# Patient Record
Sex: FEMALE | Race: WHITE | NOT HISPANIC OR LATINO
[De-identification: names, ages, dates, MRNs, and addresses within clinical notes are randomized per-mention and may not be internally consistent; named-entity substitution may affect disease eponyms.]

---

## 2017-01-03 ENCOUNTER — RX RENEWAL (OUTPATIENT)
Age: 72
End: 2017-01-03

## 2017-01-09 ENCOUNTER — APPOINTMENT (OUTPATIENT)
Dept: INFUSION THERAPY | Facility: CLINIC | Age: 72
End: 2017-01-09

## 2017-01-13 ENCOUNTER — APPOINTMENT (OUTPATIENT)
Age: 72
End: 2017-01-13

## 2017-01-13 VITALS — RESPIRATION RATE: 16 BRPM | DIASTOLIC BLOOD PRESSURE: 80 MMHG | HEART RATE: 78 BPM | SYSTOLIC BLOOD PRESSURE: 134 MMHG

## 2017-01-13 DIAGNOSIS — E11.9 TYPE 2 DIABETES MELLITUS W/OUT COMPLICATIONS: ICD-10-CM

## 2017-02-06 ENCOUNTER — APPOINTMENT (OUTPATIENT)
Dept: INFUSION THERAPY | Facility: CLINIC | Age: 72
End: 2017-02-06

## 2017-02-10 ENCOUNTER — RX RENEWAL (OUTPATIENT)
Age: 72
End: 2017-02-10

## 2017-03-06 ENCOUNTER — APPOINTMENT (OUTPATIENT)
Dept: INFUSION THERAPY | Facility: CLINIC | Age: 72
End: 2017-03-06

## 2017-03-13 ENCOUNTER — RX RENEWAL (OUTPATIENT)
Age: 72
End: 2017-03-13

## 2017-03-13 DIAGNOSIS — G62.9 POLYNEUROPATHY, UNSPECIFIED: ICD-10-CM

## 2017-03-22 ENCOUNTER — RECORD ABSTRACTING (OUTPATIENT)
Age: 72
End: 2017-03-22

## 2017-03-22 DIAGNOSIS — Z92.89 PERSONAL HISTORY OF OTHER MEDICAL TREATMENT: ICD-10-CM

## 2017-04-03 ENCOUNTER — APPOINTMENT (OUTPATIENT)
Dept: BREAST CENTER | Facility: CLINIC | Age: 72
End: 2017-04-03

## 2017-04-03 ENCOUNTER — APPOINTMENT (OUTPATIENT)
Dept: INFUSION THERAPY | Facility: CLINIC | Age: 72
End: 2017-04-03

## 2017-04-03 VITALS
SYSTOLIC BLOOD PRESSURE: 138 MMHG | HEIGHT: 67 IN | WEIGHT: 204 LBS | DIASTOLIC BLOOD PRESSURE: 88 MMHG | BODY MASS INDEX: 32.02 KG/M2

## 2017-04-10 ENCOUNTER — RX RENEWAL (OUTPATIENT)
Age: 72
End: 2017-04-10

## 2017-04-21 ENCOUNTER — APPOINTMENT (OUTPATIENT)
Age: 72
End: 2017-04-21

## 2017-05-01 ENCOUNTER — APPOINTMENT (OUTPATIENT)
Dept: INFUSION THERAPY | Facility: CLINIC | Age: 72
End: 2017-05-01

## 2017-05-01 VITALS — HEART RATE: 69 BPM | DIASTOLIC BLOOD PRESSURE: 94 MMHG | RESPIRATION RATE: 14 BRPM | SYSTOLIC BLOOD PRESSURE: 164 MMHG

## 2017-05-11 ENCOUNTER — RX RENEWAL (OUTPATIENT)
Age: 72
End: 2017-05-11

## 2017-05-11 RX ORDER — GABAPENTIN 600 MG/1
600 TABLET, COATED ORAL 3 TIMES DAILY
Qty: 90 | Refills: 0 | Status: ACTIVE | COMMUNITY
Start: 2017-05-11 | End: 1900-01-01

## 2017-05-11 RX ORDER — METHADONE HYDROCHLORIDE 5 MG/1
5 TABLET ORAL 3 TIMES DAILY
Qty: 90 | Refills: 0 | Status: ACTIVE | COMMUNITY
Start: 2017-05-11 | End: 1900-01-01

## 2017-05-31 ENCOUNTER — APPOINTMENT (OUTPATIENT)
Dept: HEMATOLOGY ONCOLOGY | Facility: CLINIC | Age: 72
End: 2017-05-31

## 2017-06-05 ENCOUNTER — APPOINTMENT (OUTPATIENT)
Dept: INFUSION THERAPY | Facility: CLINIC | Age: 72
End: 2017-06-05

## 2017-06-05 ENCOUNTER — APPOINTMENT (OUTPATIENT)
Dept: HEMATOLOGY ONCOLOGY | Facility: CLINIC | Age: 72
End: 2017-06-05

## 2017-06-05 VITALS
WEIGHT: 204 LBS | DIASTOLIC BLOOD PRESSURE: 82 MMHG | RESPIRATION RATE: 14 BRPM | TEMPERATURE: 96.5 F | SYSTOLIC BLOOD PRESSURE: 182 MMHG | HEART RATE: 69 BPM | BODY MASS INDEX: 32.02 KG/M2 | HEIGHT: 67 IN

## 2017-06-05 RX ORDER — DORZOLAMIDE HYDROCHLORIDE TIMOLOL MALEATE 20; 5 MG/ML; MG/ML
22.3-6.8 SOLUTION/ DROPS OPHTHALMIC
Qty: 10 | Refills: 0 | Status: ACTIVE | COMMUNITY
Start: 2017-02-23

## 2017-06-05 RX ORDER — TOBRAMYCIN AND DEXAMETHASONE 3; 1 MG/ML; MG/ML
0.3-0.1 SUSPENSION/ DROPS OPHTHALMIC
Qty: 5 | Refills: 0 | Status: ACTIVE | COMMUNITY
Start: 2017-05-22

## 2017-06-05 RX ORDER — LOTEPREDNOL ETABONATE 5 MG/G
0.5 GEL OPHTHALMIC
Qty: 5 | Refills: 0 | Status: COMPLETED | COMMUNITY
Start: 2017-02-14

## 2017-06-05 RX ORDER — CYCLOSPORINE 0.5 MG/ML
0.05 EMULSION OPHTHALMIC
Qty: 60 | Refills: 0 | Status: COMPLETED | COMMUNITY
Start: 2017-01-10

## 2017-06-09 ENCOUNTER — RX RENEWAL (OUTPATIENT)
Age: 72
End: 2017-06-09

## 2017-06-09 RX ORDER — METHADONE HYDROCHLORIDE 5 MG/1
5 TABLET ORAL EVERY 8 HOURS
Qty: 90 | Refills: 0 | Status: ACTIVE | COMMUNITY
Start: 2017-06-09 | End: 1900-01-01

## 2017-07-10 ENCOUNTER — APPOINTMENT (OUTPATIENT)
Dept: INFUSION THERAPY | Facility: CLINIC | Age: 72
End: 2017-07-10

## 2017-07-10 ENCOUNTER — OUTPATIENT (OUTPATIENT)
Dept: OUTPATIENT SERVICES | Facility: HOSPITAL | Age: 72
LOS: 1 days | Discharge: HOME | End: 2017-07-10

## 2017-07-10 DIAGNOSIS — C50.911 MALIGNANT NEOPLASM OF UNSPECIFIED SITE OF RIGHT FEMALE BREAST: ICD-10-CM

## 2017-07-10 DIAGNOSIS — C50.811 MALIGNANT NEOPLASM OF OVERLAPPING SITES OF RIGHT FEMALE BREAST: ICD-10-CM

## 2017-07-11 ENCOUNTER — RX RENEWAL (OUTPATIENT)
Age: 72
End: 2017-07-11

## 2017-07-11 RX ORDER — METHADONE HYDROCHLORIDE 5 MG/1
5 TABLET ORAL EVERY 8 HOURS
Qty: 90 | Refills: 0 | Status: ACTIVE | COMMUNITY
Start: 2017-07-11 | End: 1900-01-01

## 2017-08-07 ENCOUNTER — APPOINTMENT (OUTPATIENT)
Dept: INFUSION THERAPY | Facility: CLINIC | Age: 72
End: 2017-08-07

## 2017-08-09 ENCOUNTER — RX RENEWAL (OUTPATIENT)
Age: 72
End: 2017-08-09

## 2017-09-08 ENCOUNTER — APPOINTMENT (OUTPATIENT)
Dept: INFUSION THERAPY | Facility: CLINIC | Age: 72
End: 2017-09-08

## 2017-09-08 ENCOUNTER — RX RENEWAL (OUTPATIENT)
Age: 72
End: 2017-09-08

## 2017-09-14 ENCOUNTER — OUTPATIENT (OUTPATIENT)
Dept: OUTPATIENT SERVICES | Facility: HOSPITAL | Age: 72
LOS: 1 days | Discharge: HOME | End: 2017-09-14

## 2017-09-14 ENCOUNTER — APPOINTMENT (OUTPATIENT)
Age: 72
End: 2017-09-14

## 2017-09-14 VITALS
HEART RATE: 62 BPM | RESPIRATION RATE: 17 BRPM | SYSTOLIC BLOOD PRESSURE: 146 MMHG | DIASTOLIC BLOOD PRESSURE: 71 MMHG | WEIGHT: 200 LBS | TEMPERATURE: 97 F | HEIGHT: 67 IN | BODY MASS INDEX: 31.39 KG/M2

## 2017-09-14 DIAGNOSIS — Z02.9 ENCOUNTER FOR ADMINISTRATIVE EXAMINATIONS, UNSPECIFIED: ICD-10-CM

## 2017-10-02 ENCOUNTER — APPOINTMENT (OUTPATIENT)
Dept: INFUSION THERAPY | Facility: CLINIC | Age: 72
End: 2017-10-02

## 2017-10-10 ENCOUNTER — RX RENEWAL (OUTPATIENT)
Age: 72
End: 2017-10-10

## 2017-10-23 ENCOUNTER — OUTPATIENT (OUTPATIENT)
Dept: OUTPATIENT SERVICES | Facility: HOSPITAL | Age: 72
LOS: 1 days | Discharge: HOME | End: 2017-10-23

## 2017-10-23 DIAGNOSIS — Z85.3 PERSONAL HISTORY OF MALIGNANT NEOPLASM OF BREAST: ICD-10-CM

## 2017-11-03 ENCOUNTER — APPOINTMENT (OUTPATIENT)
Dept: BREAST CENTER | Facility: CLINIC | Age: 72
End: 2017-11-03
Payer: MEDICARE

## 2017-11-03 VITALS
DIASTOLIC BLOOD PRESSURE: 78 MMHG | HEIGHT: 67 IN | OXYGEN SATURATION: 97 % | WEIGHT: 191 LBS | SYSTOLIC BLOOD PRESSURE: 132 MMHG | HEART RATE: 56 BPM | BODY MASS INDEX: 29.98 KG/M2

## 2017-11-03 PROCEDURE — 99213 OFFICE O/P EST LOW 20 MIN: CPT

## 2017-11-06 ENCOUNTER — RX RENEWAL (OUTPATIENT)
Age: 72
End: 2017-11-06

## 2017-12-04 ENCOUNTER — APPOINTMENT (OUTPATIENT)
Dept: INFUSION THERAPY | Facility: CLINIC | Age: 72
End: 2017-12-04

## 2017-12-04 ENCOUNTER — APPOINTMENT (OUTPATIENT)
Dept: HEMATOLOGY ONCOLOGY | Facility: CLINIC | Age: 72
End: 2017-12-04

## 2017-12-04 VITALS
SYSTOLIC BLOOD PRESSURE: 173 MMHG | BODY MASS INDEX: 30.29 KG/M2 | DIASTOLIC BLOOD PRESSURE: 73 MMHG | HEART RATE: 61 BPM | RESPIRATION RATE: 14 BRPM | HEIGHT: 67 IN | WEIGHT: 193 LBS

## 2017-12-07 ENCOUNTER — RX RENEWAL (OUTPATIENT)
Age: 72
End: 2017-12-07

## 2018-01-01 LAB
ALBUMIN SERPL-MCNC: 3.6 G/DL
ALBUMIN/GLOB SERPL: 1.57
ALP SERPL-CCNC: 76 IU/L
ALT SERPL-CCNC: 16 IU/L
ANION GAP SERPL CALC-SCNC: 6 MEQ/L
AST SERPL-CCNC: 19 IU/L
BASOPHILS # BLD: 0.02 TH/MM3
BASOPHILS NFR BLD: 0.4 %
BILIRUB SERPL-MCNC: 0.6 MG/DL
BUN SERPL-MCNC: 12 MG/DL
BUN/CREAT SERPL: 22.2 %
CALCIUM SERPL-MCNC: 9.3 MG/DL
CANCER AG15-3 SERPL-ACNC: 9.8 U/ML
CEA SERPL-MCNC: 2.7 NG/ML
CHLORIDE SERPL-SCNC: 108 MEQ/L
CO2 SERPL-SCNC: 26 MEQ/L
CREAT SERPL-MCNC: 0.54 MG/DL
EOSINOPHIL # BLD: 0.2 TH/MM3
EOSINOPHIL NFR BLD: 3.5 %
ERYTHROCYTE [DISTWIDTH] IN BLOOD BY AUTOMATED COUNT: 13.4 %
GFR SERPL CREATININE-BSD FRML MDRD: 111
GLUCOSE SERPL-MCNC: 166 MG/DL
GRANULOCYTES # BLD: 3.83 TH/MM3
GRANULOCYTES NFR BLD: 67.6 %
HCT VFR BLD AUTO: 38.2 %
HGB BLD-MCNC: 12.6 G/DL
IMM GRANULOCYTES # BLD: 0.01 TH/MM3
IMM GRANULOCYTES NFR BLD: 0.2 %
LYMPHOCYTES # BLD: 1.05 TH/MM3
LYMPHOCYTES NFR BLD: 18.6 %
MCH RBC QN AUTO: 29.5 PG
MCHC RBC AUTO-ENTMCNC: 33 G/DL
MCV RBC AUTO: 89.5 FL
MONOCYTES # BLD: 0.55 TH/MM3
MONOCYTES NFR BLD: 9.7 %
PLATELET # BLD: 172 TH/MM3
PMV BLD AUTO: 10.8 FL
POTASSIUM SERPL-SCNC: 4 MMOL/L
PROT SERPL-MCNC: 5.9 G/DL
RBC # BLD AUTO: 4.27 MIL/MM3
SODIUM SERPL-SCNC: 140 MEQ/L
WBC # BLD: 5.66 TH/MM3

## 2018-01-05 ENCOUNTER — RX RENEWAL (OUTPATIENT)
Age: 73
End: 2018-01-05

## 2018-01-11 ENCOUNTER — OUTPATIENT (OUTPATIENT)
Dept: OUTPATIENT SERVICES | Facility: HOSPITAL | Age: 73
LOS: 1 days | Discharge: HOME | End: 2018-01-11

## 2018-01-11 ENCOUNTER — APPOINTMENT (OUTPATIENT)
Dept: INFUSION THERAPY | Facility: CLINIC | Age: 73
End: 2018-01-11

## 2018-01-11 DIAGNOSIS — C50.911 MALIGNANT NEOPLASM OF UNSPECIFIED SITE OF RIGHT FEMALE BREAST: ICD-10-CM

## 2018-02-01 ENCOUNTER — RX RENEWAL (OUTPATIENT)
Age: 73
End: 2018-02-01

## 2018-02-05 ENCOUNTER — RX RENEWAL (OUTPATIENT)
Age: 73
End: 2018-02-05

## 2018-02-16 ENCOUNTER — OUTPATIENT (OUTPATIENT)
Dept: OUTPATIENT SERVICES | Facility: HOSPITAL | Age: 73
LOS: 1 days | Discharge: HOME | End: 2018-02-16

## 2018-02-16 ENCOUNTER — APPOINTMENT (OUTPATIENT)
Dept: INFUSION THERAPY | Facility: CLINIC | Age: 73
End: 2018-02-16

## 2018-02-16 ENCOUNTER — APPOINTMENT (OUTPATIENT)
Age: 73
End: 2018-02-16

## 2018-02-26 DIAGNOSIS — G89.3 NEOPLASM RELATED PAIN (ACUTE) (CHRONIC): ICD-10-CM

## 2018-02-26 DIAGNOSIS — Z51.5 ENCOUNTER FOR PALLIATIVE CARE: ICD-10-CM

## 2018-03-07 ENCOUNTER — RX RENEWAL (OUTPATIENT)
Age: 73
End: 2018-03-07

## 2018-03-08 ENCOUNTER — APPOINTMENT (OUTPATIENT)
Dept: INFUSION THERAPY | Facility: CLINIC | Age: 73
End: 2018-03-08

## 2018-04-05 ENCOUNTER — RX RENEWAL (OUTPATIENT)
Age: 73
End: 2018-04-05

## 2018-04-05 ENCOUNTER — APPOINTMENT (OUTPATIENT)
Dept: INFUSION THERAPY | Facility: CLINIC | Age: 73
End: 2018-04-05

## 2018-04-05 RX ORDER — GABAPENTIN 600 MG/1
600 TABLET, COATED ORAL 3 TIMES DAILY
Qty: 90 | Refills: 4 | Status: ACTIVE | COMMUNITY
Start: 2017-06-09 | End: 1900-01-01

## 2018-05-03 ENCOUNTER — OUTPATIENT (OUTPATIENT)
Dept: OUTPATIENT SERVICES | Facility: HOSPITAL | Age: 73
LOS: 1 days | Discharge: HOME | End: 2018-05-03

## 2018-05-03 ENCOUNTER — APPOINTMENT (OUTPATIENT)
Dept: INFUSION THERAPY | Facility: CLINIC | Age: 73
End: 2018-05-03

## 2018-05-03 DIAGNOSIS — C50.911 MALIGNANT NEOPLASM OF UNSPECIFIED SITE OF RIGHT FEMALE BREAST: ICD-10-CM

## 2018-05-07 ENCOUNTER — RX RENEWAL (OUTPATIENT)
Age: 73
End: 2018-05-07

## 2018-05-16 ENCOUNTER — APPOINTMENT (OUTPATIENT)
Dept: BREAST CENTER | Facility: CLINIC | Age: 73
End: 2018-05-16
Payer: MEDICARE

## 2018-05-16 VITALS
OXYGEN SATURATION: 94 % | DIASTOLIC BLOOD PRESSURE: 78 MMHG | BODY MASS INDEX: 30.29 KG/M2 | SYSTOLIC BLOOD PRESSURE: 136 MMHG | WEIGHT: 193 LBS | HEART RATE: 78 BPM | HEIGHT: 67 IN

## 2018-05-16 PROCEDURE — 99213 OFFICE O/P EST LOW 20 MIN: CPT

## 2018-05-22 ENCOUNTER — OUTPATIENT (OUTPATIENT)
Dept: OUTPATIENT SERVICES | Facility: HOSPITAL | Age: 73
LOS: 1 days | Discharge: HOME | End: 2018-05-22

## 2018-05-22 DIAGNOSIS — C50.911 MALIGNANT NEOPLASM OF UNSPECIFIED SITE OF RIGHT FEMALE BREAST: ICD-10-CM

## 2018-05-24 ENCOUNTER — OUTPATIENT (OUTPATIENT)
Dept: OUTPATIENT SERVICES | Facility: HOSPITAL | Age: 73
LOS: 1 days | Discharge: HOME | End: 2018-05-24

## 2018-05-24 VITALS
OXYGEN SATURATION: 99 % | HEART RATE: 56 BPM | DIASTOLIC BLOOD PRESSURE: 82 MMHG | RESPIRATION RATE: 16 BRPM | SYSTOLIC BLOOD PRESSURE: 140 MMHG | TEMPERATURE: 97 F | WEIGHT: 190.04 LBS

## 2018-05-24 DIAGNOSIS — Z98.890 OTHER SPECIFIED POSTPROCEDURAL STATES: Chronic | ICD-10-CM

## 2018-05-24 DIAGNOSIS — Z01.818 ENCOUNTER FOR OTHER PREPROCEDURAL EXAMINATION: ICD-10-CM

## 2018-05-24 DIAGNOSIS — C50.911 MALIGNANT NEOPLASM OF UNSPECIFIED SITE OF RIGHT FEMALE BREAST: ICD-10-CM

## 2018-05-24 DIAGNOSIS — M21.949 UNSPECIFIED ACQUIRED DEFORMITY OF HAND, UNSPECIFIED HAND: Chronic | ICD-10-CM

## 2018-05-24 DIAGNOSIS — Z90.710 ACQUIRED ABSENCE OF BOTH CERVIX AND UTERUS: Chronic | ICD-10-CM

## 2018-05-24 LAB
ALBUMIN SERPL ELPH-MCNC: 4.1 G/DL — SIGNIFICANT CHANGE UP (ref 3.5–5.2)
ALP SERPL-CCNC: 152 U/L — HIGH (ref 30–115)
ALT FLD-CCNC: 12 U/L — SIGNIFICANT CHANGE UP (ref 0–41)
ANION GAP SERPL CALC-SCNC: 10 MMOL/L — SIGNIFICANT CHANGE UP (ref 7–14)
APTT BLD: 27.5 SEC — SIGNIFICANT CHANGE UP (ref 27–39.2)
AST SERPL-CCNC: 16 U/L — SIGNIFICANT CHANGE UP (ref 0–41)
BASOPHILS # BLD AUTO: 0.03 K/UL — SIGNIFICANT CHANGE UP (ref 0–0.2)
BASOPHILS NFR BLD AUTO: 0.5 % — SIGNIFICANT CHANGE UP (ref 0–1)
BILIRUB SERPL-MCNC: 0.3 MG/DL — SIGNIFICANT CHANGE UP (ref 0.2–1.2)
BUN SERPL-MCNC: 13 MG/DL — SIGNIFICANT CHANGE UP (ref 10–20)
CALCIUM SERPL-MCNC: 9.2 MG/DL — SIGNIFICANT CHANGE UP (ref 8.5–10.1)
CHLORIDE SERPL-SCNC: 102 MMOL/L — SIGNIFICANT CHANGE UP (ref 98–110)
CO2 SERPL-SCNC: 29 MMOL/L — SIGNIFICANT CHANGE UP (ref 17–32)
CREAT SERPL-MCNC: 0.6 MG/DL — LOW (ref 0.7–1.5)
EOSINOPHIL # BLD AUTO: 0.29 K/UL — SIGNIFICANT CHANGE UP (ref 0–0.7)
EOSINOPHIL NFR BLD AUTO: 5.2 % — SIGNIFICANT CHANGE UP (ref 0–8)
GLUCOSE SERPL-MCNC: 106 MG/DL — HIGH (ref 70–99)
HCT VFR BLD CALC: 40.1 % — SIGNIFICANT CHANGE UP (ref 37–47)
HGB BLD-MCNC: 13 G/DL — SIGNIFICANT CHANGE UP (ref 12–16)
IMM GRANULOCYTES NFR BLD AUTO: 0.5 % — HIGH (ref 0.1–0.3)
INR BLD: 1.05 RATIO — SIGNIFICANT CHANGE UP (ref 0.65–1.3)
LYMPHOCYTES # BLD AUTO: 1.2 K/UL — SIGNIFICANT CHANGE UP (ref 1.2–3.4)
LYMPHOCYTES # BLD AUTO: 21.5 % — SIGNIFICANT CHANGE UP (ref 20.5–51.1)
MCHC RBC-ENTMCNC: 28.8 PG — SIGNIFICANT CHANGE UP (ref 27–31)
MCHC RBC-ENTMCNC: 32.4 G/DL — SIGNIFICANT CHANGE UP (ref 32–37)
MCV RBC AUTO: 88.7 FL — SIGNIFICANT CHANGE UP (ref 81–99)
MONOCYTES # BLD AUTO: 0.5 K/UL — SIGNIFICANT CHANGE UP (ref 0.1–0.6)
MONOCYTES NFR BLD AUTO: 8.9 % — SIGNIFICANT CHANGE UP (ref 1.7–9.3)
NEUTROPHILS # BLD AUTO: 3.54 K/UL — SIGNIFICANT CHANGE UP (ref 1.4–6.5)
NEUTROPHILS NFR BLD AUTO: 63.4 % — SIGNIFICANT CHANGE UP (ref 42.2–75.2)
NRBC # BLD: 0 /100 WBCS — SIGNIFICANT CHANGE UP (ref 0–0)
PLATELET # BLD AUTO: 187 K/UL — SIGNIFICANT CHANGE UP (ref 130–400)
POTASSIUM SERPL-MCNC: 4.8 MMOL/L — SIGNIFICANT CHANGE UP (ref 3.5–5)
POTASSIUM SERPL-SCNC: 4.8 MMOL/L — SIGNIFICANT CHANGE UP (ref 3.5–5)
PROT SERPL-MCNC: 6.9 G/DL — SIGNIFICANT CHANGE UP (ref 6–8)
PROTHROM AB SERPL-ACNC: 11.4 SEC — SIGNIFICANT CHANGE UP (ref 9.95–12.87)
RBC # BLD: 4.52 M/UL — SIGNIFICANT CHANGE UP (ref 4.2–5.4)
RBC # FLD: 13.4 % — SIGNIFICANT CHANGE UP (ref 11.5–14.5)
SODIUM SERPL-SCNC: 141 MMOL/L — SIGNIFICANT CHANGE UP (ref 135–146)
WBC # BLD: 5.59 K/UL — SIGNIFICANT CHANGE UP (ref 4.8–10.8)
WBC # FLD AUTO: 5.59 K/UL — SIGNIFICANT CHANGE UP (ref 4.8–10.8)

## 2018-05-24 NOTE — H&P PST ADULT - PSH
H/O abdominal hysterectomy    H/O lumpectomy  rt with excision of lymph nodes  Hand deformities  B/L HYPERFLEXION, ULNAR DEVIATION WITH PAIN -SINCE 2013 WITH DIAGNOSIS OF BREAST CANCER.

## 2018-05-24 NOTE — H&P PST ADULT - ADDITIONAL PE
IMPRESSION OF PORT LT CHEST, ABDOMINAL HERNIA NOTED -REDUCIBLE, FINGERS HYPERFLEXED, INABILITY TO EXTEND FINGERS.

## 2018-05-24 NOTE — H&P PST ADULT - HISTORY OF PRESENT ILLNESS
73YOF, presents for removal of port. Denies c/o abd pain , n/v cp ,palp, sob, uri , fever ,rash or uti symptoms. Exercise henna 1 FOS LTD BY fatigue. Takes pain methadone for pain in hands.

## 2018-05-29 ENCOUNTER — APPOINTMENT (OUTPATIENT)
Dept: BREAST CENTER | Facility: AMBULATORY SURGERY CENTER | Age: 73
End: 2018-05-29
Payer: MEDICARE

## 2018-05-29 ENCOUNTER — OUTPATIENT (OUTPATIENT)
Dept: OUTPATIENT SERVICES | Facility: HOSPITAL | Age: 73
LOS: 1 days | Discharge: HOME | End: 2018-05-29

## 2018-05-29 ENCOUNTER — RESULT REVIEW (OUTPATIENT)
Age: 73
End: 2018-05-29

## 2018-05-29 VITALS
OXYGEN SATURATION: 99 % | WEIGHT: 190.04 LBS | SYSTOLIC BLOOD PRESSURE: 152 MMHG | DIASTOLIC BLOOD PRESSURE: 65 MMHG | TEMPERATURE: 98 F | HEART RATE: 89 BPM | RESPIRATION RATE: 18 BRPM

## 2018-05-29 VITALS
RESPIRATION RATE: 11 BRPM | DIASTOLIC BLOOD PRESSURE: 73 MMHG | SYSTOLIC BLOOD PRESSURE: 153 MMHG | OXYGEN SATURATION: 98 % | HEART RATE: 63 BPM

## 2018-05-29 DIAGNOSIS — Z98.890 OTHER SPECIFIED POSTPROCEDURAL STATES: Chronic | ICD-10-CM

## 2018-05-29 DIAGNOSIS — M21.949 UNSPECIFIED ACQUIRED DEFORMITY OF HAND, UNSPECIFIED HAND: Chronic | ICD-10-CM

## 2018-05-29 DIAGNOSIS — Z90.710 ACQUIRED ABSENCE OF BOTH CERVIX AND UTERUS: Chronic | ICD-10-CM

## 2018-05-29 PROCEDURE — 36590 REMOVAL TUNNELED CV CATH: CPT

## 2018-05-29 RX ORDER — ONDANSETRON 8 MG/1
4 TABLET, FILM COATED ORAL ONCE
Qty: 0 | Refills: 0 | Status: DISCONTINUED | OUTPATIENT
Start: 2018-05-29 | End: 2018-06-13

## 2018-05-29 RX ORDER — OXYCODONE AND ACETAMINOPHEN 5; 325 MG/1; MG/1
1 TABLET ORAL ONCE
Qty: 0 | Refills: 0 | Status: DISCONTINUED | OUTPATIENT
Start: 2018-05-29 | End: 2018-05-29

## 2018-05-29 RX ORDER — MORPHINE SULFATE 50 MG/1
2 CAPSULE, EXTENDED RELEASE ORAL ONCE
Qty: 0 | Refills: 0 | Status: DISCONTINUED | OUTPATIENT
Start: 2018-05-29 | End: 2018-05-29

## 2018-05-29 RX ORDER — SODIUM CHLORIDE 9 MG/ML
1000 INJECTION, SOLUTION INTRAVENOUS
Qty: 0 | Refills: 0 | Status: DISCONTINUED | OUTPATIENT
Start: 2018-05-29 | End: 2018-06-13

## 2018-05-29 RX ADMIN — SODIUM CHLORIDE 100 MILLILITER(S): 9 INJECTION, SOLUTION INTRAVENOUS at 12:06

## 2018-05-29 NOTE — BRIEF OPERATIVE NOTE - PROCEDURE
<<-----Click on this checkbox to enter Procedure Removal of catheter from chest  05/29/2018    Active  GUDELIA

## 2018-05-29 NOTE — ASU DISCHARGE PLAN (ADULT/PEDIATRIC). - ASU FOLLOWUP
911 or go to the nearest Emergency Room [facility]:  [facility]...

## 2018-05-29 NOTE — ASU DISCHARGE PLAN (ADULT/PEDIATRIC). - MEDICATION SUMMARY - MEDICATIONS TO TAKE
I will START or STAY ON the medications listed below when I get home from the hospital:    methadone 5 mg oral tablet  -- 1 tab(s) by mouth every 8 hours  -- Indication: For Continue home medications as prescribed    gabapentin 600 mg oral tablet  -- 1 tab(s) by mouth 3 times a day  -- Indication: For Continue home medications as prescribed    Tresiba FlexTouch 100 units/mL subcutaneous solution  -- 14  subcutaneous once a day (at bedtime)  -- Indication: For Continue home medications as prescribed    anastrozole 1 mg oral tablet  -- 1 tab(s) by mouth once a day  -- Indication: For Continue home medications as prescribed    Lumigan 0.01% ophthalmic solution  -- 1 drop(s) to each affected eye once a day (in the evening) b/l eyes  -- Indication: For Continue home medications as prescribed    dorzolamide 2% ophthalmic solution  -- to each affected eye 2 times a day- B/L EYES  -- Indication: For Continue home medications as prescribed

## 2018-05-31 ENCOUNTER — APPOINTMENT (OUTPATIENT)
Dept: HEMATOLOGY ONCOLOGY | Facility: CLINIC | Age: 73
End: 2018-05-31

## 2018-05-31 ENCOUNTER — APPOINTMENT (OUTPATIENT)
Dept: INFUSION THERAPY | Facility: CLINIC | Age: 73
End: 2018-05-31

## 2018-05-31 DIAGNOSIS — Z45.2 ENCOUNTER FOR ADJUSTMENT AND MANAGEMENT OF VASCULAR ACCESS DEVICE: ICD-10-CM

## 2018-05-31 DIAGNOSIS — Z88.0 ALLERGY STATUS TO PENICILLIN: ICD-10-CM

## 2018-05-31 DIAGNOSIS — Z85.3 PERSONAL HISTORY OF MALIGNANT NEOPLASM OF BREAST: ICD-10-CM

## 2018-05-31 DIAGNOSIS — E11.9 TYPE 2 DIABETES MELLITUS WITHOUT COMPLICATIONS: ICD-10-CM

## 2018-06-01 LAB — SURGICAL PATHOLOGY STUDY: SIGNIFICANT CHANGE UP

## 2018-06-05 ENCOUNTER — RX RENEWAL (OUTPATIENT)
Age: 73
End: 2018-06-05

## 2018-06-05 RX ORDER — METHADONE HYDROCHLORIDE 5 MG/1
5 TABLET ORAL EVERY 8 HOURS
Qty: 90 | Refills: 0 | Status: ACTIVE | COMMUNITY
Start: 2017-08-09 | End: 1900-01-01

## 2018-06-08 ENCOUNTER — APPOINTMENT (OUTPATIENT)
Dept: BREAST CENTER | Facility: CLINIC | Age: 73
End: 2018-06-08
Payer: MEDICARE

## 2018-06-08 VITALS
HEIGHT: 67 IN | WEIGHT: 193 LBS | HEART RATE: 70 BPM | BODY MASS INDEX: 30.29 KG/M2 | DIASTOLIC BLOOD PRESSURE: 78 MMHG | SYSTOLIC BLOOD PRESSURE: 130 MMHG | OXYGEN SATURATION: 93 %

## 2018-06-08 PROCEDURE — 99024 POSTOP FOLLOW-UP VISIT: CPT

## 2018-06-28 ENCOUNTER — LABORATORY RESULT (OUTPATIENT)
Age: 73
End: 2018-06-28

## 2018-06-28 ENCOUNTER — OUTPATIENT (OUTPATIENT)
Dept: OUTPATIENT SERVICES | Facility: HOSPITAL | Age: 73
LOS: 1 days | Discharge: HOME | End: 2018-06-28

## 2018-06-28 ENCOUNTER — APPOINTMENT (OUTPATIENT)
Dept: HEMATOLOGY ONCOLOGY | Facility: CLINIC | Age: 73
End: 2018-06-28

## 2018-06-28 VITALS
HEART RATE: 99 BPM | SYSTOLIC BLOOD PRESSURE: 127 MMHG | TEMPERATURE: 97.8 F | HEIGHT: 67 IN | WEIGHT: 192 LBS | BODY MASS INDEX: 30.13 KG/M2 | DIASTOLIC BLOOD PRESSURE: 65 MMHG

## 2018-06-28 DIAGNOSIS — Z98.890 OTHER SPECIFIED POSTPROCEDURAL STATES: Chronic | ICD-10-CM

## 2018-06-28 DIAGNOSIS — Z90.710 ACQUIRED ABSENCE OF BOTH CERVIX AND UTERUS: Chronic | ICD-10-CM

## 2018-06-28 DIAGNOSIS — M21.949 UNSPECIFIED ACQUIRED DEFORMITY OF HAND, UNSPECIFIED HAND: Chronic | ICD-10-CM

## 2018-07-01 LAB
CANCER AG15-3 SERPL-ACNC: 11.3 U/ML
CEA SERPL-MCNC: 2.7 NG/ML
HCT VFR BLD CALC: 41.1 %
HGB BLD-MCNC: 13.6 G/DL
MCHC RBC-ENTMCNC: 29.5 PG
MCHC RBC-ENTMCNC: 33.1 G/DL
MCV RBC AUTO: 89.2 FL
PLATELET # BLD AUTO: 207 K/UL
PMV BLD: 9.4 FL
RBC # BLD: 4.61 M/UL
RBC # FLD: 13.4 %
WBC # FLD AUTO: 5.18 K/UL

## 2018-07-02 DIAGNOSIS — C50.911 MALIGNANT NEOPLASM OF UNSPECIFIED SITE OF RIGHT FEMALE BREAST: ICD-10-CM

## 2018-07-06 ENCOUNTER — RX RENEWAL (OUTPATIENT)
Age: 73
End: 2018-07-06

## 2018-08-03 ENCOUNTER — RX RENEWAL (OUTPATIENT)
Age: 73
End: 2018-08-03

## 2018-08-03 RX ORDER — METHADONE HYDROCHLORIDE 5 MG/1
5 TABLET ORAL EVERY 8 HOURS
Qty: 90 | Refills: 0 | Status: ACTIVE | COMMUNITY
Start: 2017-02-10 | End: 1900-01-01

## 2018-09-04 ENCOUNTER — RX RENEWAL (OUTPATIENT)
Age: 73
End: 2018-09-04

## 2018-09-04 RX ORDER — METHADONE HYDROCHLORIDE 5 MG/1
5 TABLET ORAL EVERY 8 HOURS
Qty: 90 | Refills: 0 | Status: ACTIVE | COMMUNITY
Start: 2017-04-10 | End: 1900-01-01

## 2018-10-01 ENCOUNTER — RX RENEWAL (OUTPATIENT)
Age: 73
End: 2018-10-01

## 2018-10-12 PROBLEM — C80.1 MALIGNANT (PRIMARY) NEOPLASM, UNSPECIFIED: Chronic | Status: ACTIVE | Noted: 2018-05-24

## 2018-10-12 PROBLEM — H40.9 UNSPECIFIED GLAUCOMA: Chronic | Status: ACTIVE | Noted: 2018-05-24

## 2018-10-12 PROBLEM — E11.9 TYPE 2 DIABETES MELLITUS WITHOUT COMPLICATIONS: Chronic | Status: ACTIVE | Noted: 2018-05-24

## 2018-10-15 ENCOUNTER — OUTPATIENT (OUTPATIENT)
Dept: OUTPATIENT SERVICES | Facility: HOSPITAL | Age: 73
LOS: 1 days | Discharge: HOME | End: 2018-10-15

## 2018-10-15 DIAGNOSIS — M21.949 UNSPECIFIED ACQUIRED DEFORMITY OF HAND, UNSPECIFIED HAND: ICD-10-CM

## 2018-10-15 DIAGNOSIS — S42.201A UNSPECIFIED FRACTURE OF UPPER END OF RIGHT HUMERUS, INITIAL ENCOUNTER FOR CLOSED FRACTURE: ICD-10-CM

## 2018-10-15 DIAGNOSIS — Z98.890 OTHER SPECIFIED POSTPROCEDURAL STATES: Chronic | ICD-10-CM

## 2018-10-15 DIAGNOSIS — M21.949 UNSPECIFIED ACQUIRED DEFORMITY OF HAND, UNSPECIFIED HAND: Chronic | ICD-10-CM

## 2018-10-15 DIAGNOSIS — Z90.710 ACQUIRED ABSENCE OF BOTH CERVIX AND UTERUS: Chronic | ICD-10-CM

## 2018-10-24 ENCOUNTER — FORM ENCOUNTER (OUTPATIENT)
Age: 73
End: 2018-10-24

## 2018-10-25 ENCOUNTER — OUTPATIENT (OUTPATIENT)
Dept: OUTPATIENT SERVICES | Facility: HOSPITAL | Age: 73
LOS: 1 days | Discharge: HOME | End: 2018-10-25

## 2018-10-25 DIAGNOSIS — Z90.710 ACQUIRED ABSENCE OF BOTH CERVIX AND UTERUS: Chronic | ICD-10-CM

## 2018-10-25 DIAGNOSIS — R92.8 OTHER ABNORMAL AND INCONCLUSIVE FINDINGS ON DIAGNOSTIC IMAGING OF BREAST: ICD-10-CM

## 2018-10-25 DIAGNOSIS — Z98.890 OTHER SPECIFIED POSTPROCEDURAL STATES: Chronic | ICD-10-CM

## 2018-10-25 DIAGNOSIS — M21.949 UNSPECIFIED ACQUIRED DEFORMITY OF HAND, UNSPECIFIED HAND: Chronic | ICD-10-CM

## 2018-11-02 ENCOUNTER — APPOINTMENT (OUTPATIENT)
Dept: BREAST CENTER | Facility: CLINIC | Age: 73
End: 2018-11-02
Payer: MEDICARE

## 2018-11-02 VITALS
DIASTOLIC BLOOD PRESSURE: 86 MMHG | WEIGHT: 192 LBS | HEIGHT: 67 IN | OXYGEN SATURATION: 99 % | SYSTOLIC BLOOD PRESSURE: 122 MMHG | HEART RATE: 58 BPM | BODY MASS INDEX: 30.13 KG/M2

## 2018-11-02 PROCEDURE — 99213 OFFICE O/P EST LOW 20 MIN: CPT

## 2018-12-20 ENCOUNTER — LABORATORY RESULT (OUTPATIENT)
Age: 73
End: 2018-12-20

## 2018-12-20 ENCOUNTER — OUTPATIENT (OUTPATIENT)
Dept: OUTPATIENT SERVICES | Facility: HOSPITAL | Age: 73
LOS: 1 days | Discharge: HOME | End: 2018-12-20

## 2018-12-20 ENCOUNTER — APPOINTMENT (OUTPATIENT)
Dept: HEMATOLOGY ONCOLOGY | Facility: CLINIC | Age: 73
End: 2018-12-20

## 2018-12-20 VITALS
TEMPERATURE: 97 F | HEIGHT: 67 IN | HEART RATE: 55 BPM | DIASTOLIC BLOOD PRESSURE: 93 MMHG | SYSTOLIC BLOOD PRESSURE: 146 MMHG | BODY MASS INDEX: 30.13 KG/M2 | RESPIRATION RATE: 16 BRPM | WEIGHT: 192 LBS

## 2018-12-20 DIAGNOSIS — Z90.710 ACQUIRED ABSENCE OF BOTH CERVIX AND UTERUS: Chronic | ICD-10-CM

## 2018-12-20 DIAGNOSIS — Z98.890 OTHER SPECIFIED POSTPROCEDURAL STATES: Chronic | ICD-10-CM

## 2018-12-20 DIAGNOSIS — M21.949 UNSPECIFIED ACQUIRED DEFORMITY OF HAND, UNSPECIFIED HAND: Chronic | ICD-10-CM

## 2018-12-20 NOTE — ASSESSMENT
[FreeTextEntry1] : History of ER/[place] positive and HER2/asmita negative advanced right-sided breast cancer, status post neoadjuvant chemotherapy followed by right breast lumpectomy and right axillary lymph node dissection, adjuvant breast radiotherapy; currently on adjuvant endocrine therapy.  There is no evidence of recurrent disease.\par \par RECOMMENDATION: \par 1. Continue anastrozole, calcium and vitamin D supplement. She will benefit with extended endocrine therapy due to h/o locally advanced breast cancer.\par 2. Continue annual screening mammogram. \par 3  Blood workup today for CBC, CMP and tumor markers.\par 4. She will come back for followup visit in six months. \par 5. She will follow up with her PCP for her other health care issues.

## 2018-12-20 NOTE — PHYSICAL EXAM
[Fully active, able to carry on all pre-disease performance without restriction] : Status 0 - Fully active, able to carry on all pre-disease performance without restriction [Normal] : affect appropriate [de-identified] : The right breast is status post lumpectomy. There is no palpable mass, skin lesion and no palpable right axillary lymphadenopathy. Left breast and left axilla is normal. [de-identified] : Chronic osteoarthropathy in her both hands.

## 2018-12-20 NOTE — CONSULT LETTER
[Dear  ___] : Dear  [unfilled], [Courtesy Letter:] : I had the pleasure of seeing your patient, [unfilled], in my office today. [Please see my note below.] : Please see my note below. [Sincerely,] : Sincerely, [FreeTextEntry3] : Clemencia Garcia MD

## 2018-12-20 NOTE — HISTORY OF PRESENT ILLNESS
[de-identified] : 73-year-old female is here today for a followup visit.  She has a history of locally advanced right-sided breast cancer and initially presented with multiple right axillary lymphadenopathy and right supraclavicular lymphadenopathy at the time of diagnosis in 2013.  The biopsy of the right breast mass revealed infiltrating poorly differentiated ductal carcinoma, ER/ME positive and HER2/asmita negative.  Her initial PET/CT showed FDGavid right axillary and right supraclavicular lymphadenopathy as well as FDGavid breast mass.  There was no evidence of distant metastasis.  She received neoadjuvant chemotherapy with dose-dense Adriamycin and Cytoxan for four cycles followed by paclitaxel every other week for four cycles.  She finished her chemotherapy in 10/2013.  She had a good partial response.  In 01/2014, she had a right breast lumpectomy and right axillary lymph node dissection.  The pathology revealed residual invasive poorly differentiated ductal carcinoma measuring 1.1cm.  There was no lymphovascular or perineural invasion.  A 4/23 lymph nodes was positive for metastatic carcinoma with focal extranodal extension.  The AJCC Seventh Edition Pathologic Stage after neoadjuvant chemotherapy was IIIA (ypT1c N2a M0).   After surgery, she received adjuvant breast radiotherapy.  Subsequently, she started on adjuvant endocrine therapy initially with letrozole, followed by exemestane.  She had a lot of body aching and fatigue from the medication.  Eventually, she switched to anastrozole, and has been tolerating it better. The patient also has a history of osteoarthropathy in her hands and feet.  She has stiffness in her finger joints and cannot extend her fingers.  She saw several rheumatologists; however, did not have a clear diagnosis.  The patient has been seeing Dr. Greenfield for pain management.  In 10/23/17 she had a bilateral diagnostic mammogram and breast ultrasound which did not reveal suspicious finding. In September 2016 she had a bone density which showed osteopenia in the hip. Her bone density in the spine and the femoral neck was normal. She has been taking calcium and vitamin D supplements. \par \par \par  [de-identified] : In 10/2017, b/l dx mammo did not reveal suspicious finding.\par Interim. she saw Dr. Raman and had her port removed.\par She has arthritis and chronic joint pain and stiffness. She is taking methadone for pain. \par \par 12/20/18:\par The patient is here for followup visit. She has been taking Anastrozole daily. She had b/l screening mammo in 10/2018. There was no suspicious finding. Her last bone density was in 9/2016. She still has pain and stiffness in her hands.

## 2018-12-26 DIAGNOSIS — C50.911 MALIGNANT NEOPLASM OF UNSPECIFIED SITE OF RIGHT FEMALE BREAST: ICD-10-CM

## 2018-12-26 DIAGNOSIS — Z79.811 LONG TERM (CURRENT) USE OF AROMATASE INHIBITORS: ICD-10-CM

## 2018-12-31 RX ORDER — GABAPENTIN 600 MG/1
600 TABLET, COATED ORAL EVERY 8 HOURS
Qty: 90 | Refills: 0 | Status: ACTIVE | COMMUNITY
Start: 2017-03-13 | End: 1900-01-01

## 2019-01-15 ENCOUNTER — RX RENEWAL (OUTPATIENT)
Age: 74
End: 2019-01-15

## 2019-04-09 ENCOUNTER — OUTPATIENT (OUTPATIENT)
Dept: OUTPATIENT SERVICES | Facility: HOSPITAL | Age: 74
LOS: 1 days | Discharge: HOME | End: 2019-04-09

## 2019-04-09 DIAGNOSIS — Z98.890 OTHER SPECIFIED POSTPROCEDURAL STATES: Chronic | ICD-10-CM

## 2019-04-09 DIAGNOSIS — M21.949 UNSPECIFIED ACQUIRED DEFORMITY OF HAND, UNSPECIFIED HAND: Chronic | ICD-10-CM

## 2019-04-09 DIAGNOSIS — G90.09 OTHER IDIOPATHIC PERIPHERAL AUTONOMIC NEUROPATHY: ICD-10-CM

## 2019-04-09 DIAGNOSIS — Z90.710 ACQUIRED ABSENCE OF BOTH CERVIX AND UTERUS: Chronic | ICD-10-CM

## 2019-04-18 NOTE — ASU DISCHARGE PLAN (ADULT/PEDIATRIC). - MEDICATION SUMMARY - MEDICATIONS TO STOP TAKING
Gastritis    Myoma
I will STOP taking the medications listed below when I get home from the hospital:  None

## 2019-06-12 ENCOUNTER — APPOINTMENT (OUTPATIENT)
Dept: NEUROLOGY | Facility: CLINIC | Age: 74
End: 2019-06-12

## 2019-06-26 ENCOUNTER — OUTPATIENT (OUTPATIENT)
Dept: OUTPATIENT SERVICES | Facility: HOSPITAL | Age: 74
LOS: 1 days | Discharge: HOME | End: 2019-06-26

## 2019-06-26 ENCOUNTER — APPOINTMENT (OUTPATIENT)
Dept: HEMATOLOGY ONCOLOGY | Facility: CLINIC | Age: 74
End: 2019-06-26
Payer: MEDICARE

## 2019-06-26 ENCOUNTER — LABORATORY RESULT (OUTPATIENT)
Age: 74
End: 2019-06-26

## 2019-06-26 VITALS
HEART RATE: 74 BPM | HEIGHT: 67 IN | TEMPERATURE: 98.7 F | DIASTOLIC BLOOD PRESSURE: 60 MMHG | SYSTOLIC BLOOD PRESSURE: 134 MMHG | BODY MASS INDEX: 26.68 KG/M2 | WEIGHT: 170 LBS

## 2019-06-26 DIAGNOSIS — Z98.890 OTHER SPECIFIED POSTPROCEDURAL STATES: Chronic | ICD-10-CM

## 2019-06-26 DIAGNOSIS — Z90.710 ACQUIRED ABSENCE OF BOTH CERVIX AND UTERUS: Chronic | ICD-10-CM

## 2019-06-26 DIAGNOSIS — M21.949 UNSPECIFIED ACQUIRED DEFORMITY OF HAND, UNSPECIFIED HAND: Chronic | ICD-10-CM

## 2019-06-26 LAB
ALBUMIN SERPL ELPH-MCNC: 4.1 G/DL
ALP BLD-CCNC: 87 U/L
ALT SERPL-CCNC: 14 U/L
ANION GAP SERPL CALC-SCNC: 13 MMOL/L
AST SERPL-CCNC: 16 U/L
BILIRUB SERPL-MCNC: 0.5 MG/DL
BUN SERPL-MCNC: 14 MG/DL
CALCIUM SERPL-MCNC: 10 MG/DL
CANCER AG15-3 SERPL-ACNC: 13.6 U/ML
CEA SERPL-MCNC: 3.1 NG/ML
CHLORIDE SERPL-SCNC: 100 MMOL/L
CO2 SERPL-SCNC: 27 MMOL/L
CREAT SERPL-MCNC: 0.5 MG/DL
GLUCOSE SERPL-MCNC: 138 MG/DL
HCT VFR BLD CALC: 40.9 %
HGB BLD-MCNC: 13.6 G/DL
MCHC RBC-ENTMCNC: 30.1 PG
MCHC RBC-ENTMCNC: 33.3 G/DL
MCV RBC AUTO: 90.5 FL
PLATELET # BLD AUTO: 209 K/UL
PMV BLD: 9.6 FL
POTASSIUM SERPL-SCNC: 4.5 MMOL/L
PROT SERPL-MCNC: 6.8 G/DL
RBC # BLD: 4.52 M/UL
RBC # FLD: 13.1 %
SODIUM SERPL-SCNC: 140 MMOL/L
WBC # FLD AUTO: 4.97 K/UL

## 2019-06-26 PROCEDURE — 99214 OFFICE O/P EST MOD 30 MIN: CPT

## 2019-06-26 NOTE — PHYSICAL EXAM
[Fully active, able to carry on all pre-disease performance without restriction] : Status 0 - Fully active, able to carry on all pre-disease performance without restriction [Normal] : affect appropriate [de-identified] : The right breast is status post lumpectomy. There is no palpable mass, skin lesion and no palpable right axillary lymphadenopathy. Left breast and left axilla is normal. [de-identified] : Chronic osteoarthropathy in her both hands.

## 2019-06-26 NOTE — ASSESSMENT
[FreeTextEntry1] : ER/[place] positive and HER2/asmita negative advanced right-sided breast cancer, status post neoadjuvant chemotherapy followed by right breast lumpectomy and right axillary lymph node dissection, adjuvant breast radiotherapy; currently on adjuvant endocrine therapy.  There is no evidence of recurrent disease.\par \par RECOMMENDATION: \par 1. Continue anastrozole, calcium and vitamin D supplement. She will benefit with extended endocrine therapy due to h/o locally advanced breast cancer.\par 2. Continue annual screening mammogram. Due 10/2019\par 3  Blood workup today for CBC, CMP and tumor markers.\par 4. She will come back for followup visit in six months. \par 5. She will follow up with her PCP for her other health care issues.\par \par Case was seen and discussed with Dr. Garcia  who agreed with the assessment and plan.\par

## 2019-06-26 NOTE — HISTORY OF PRESENT ILLNESS
[de-identified] : 73-year-old female is here today for a followup visit.  She has a history of locally advanced right-sided breast cancer and initially presented with multiple right axillary lymphadenopathy and right supraclavicular lymphadenopathy at the time of diagnosis in 2013.  The biopsy of the right breast mass revealed infiltrating poorly differentiated ductal carcinoma, ER/[place] positive and HER2/asmita negative.  Her initial PET/CT showed FDGavid right axillary and right supraclavicular lymphadenopathy as well as FDGavid breast mass.  There was no evidence of distant metastasis.  She received neoadjuvant chemotherapy with dose-dense Adriamycin and Cytoxan for four cycles followed by paclitaxel every other week for four cycles.  She finished her chemotherapy in 10/2013.  She had a good partial response.  In 01/2014, she had a right breast lumpectomy and right axillary lymph node dissection.  The pathology revealed residual invasive poorly differentiated ductal carcinoma measuring 1.1cm.  There was no lymphovascular or perineural invasion.  A 4/23 lymph nodes was positive for metastatic carcinoma with focal extranodal extension.  The AJCC Seventh Edition Pathologic Stage after neoadjuvant chemotherapy was IIIA (ypT1c N2a M0).   After surgery, she received adjuvant breast radiotherapy.  Subsequently, she started on adjuvant endocrine therapy initially with letrozole, followed by exemestane.  She had a lot of body aching and fatigue from the medication.  Eventually, she switched to anastrozole, and has been tolerating it better. The patient also has a history of osteoarthropathy in her hands and feet.  She has stiffness in her finger joints and cannot extend her fingers.  She saw several rheumatologists; however, did not have a clear diagnosis.  The patient has been seeing Dr. Greenfield for pain management.  In 10/23/17 she had a bilateral diagnostic mammogram and breast ultrasound which did not reveal suspicious finding. In September 2016 she had a bone density which showed osteopenia in the hip. Her bone density in the spine and the femoral neck was normal. She has been taking calcium and vitamin D supplements. \par \par \par  [de-identified] : In 10/2017, b/l dx mammo did not reveal suspicious finding.\par Interim. she saw Dr. Raman and had her port removed.\par She has arthritis and chronic joint pain and stiffness. She is taking methadone for pain. \par \par 12/20/18:\par The patient is here for followup visit. She has been taking Anastrozole daily. She had b/l screening mammo in 10/2018. There was no suspicious finding. Her last bone density was in 9/2016. She still has pain and stiffness in her hands.\par \par 6/26/19\par Patient is here today for follow up visit.  She has h/o stage IIIA breast cancer on AI since 1/2014, initially with Letrozole, then Exemestane, now Anastrozole.  She is c/o fatigue, weakness.  She is taking Methadone 10 mg BID and Gabapentin 600mg PO q HS for pain.  Labs reviewed.  Hgb=13 on 12/2018. Last mammogram was done 10/2018 shows no malignancy.  Bone density 1/2019 shows osteopenia which is improving prior to last one. She follows up with her PCP.

## 2019-06-27 ENCOUNTER — APPOINTMENT (OUTPATIENT)
Dept: HEMATOLOGY ONCOLOGY | Facility: CLINIC | Age: 74
End: 2019-06-27
Payer: MEDICARE

## 2019-07-01 DIAGNOSIS — C50.911 MALIGNANT NEOPLASM OF UNSPECIFIED SITE OF RIGHT FEMALE BREAST: ICD-10-CM

## 2019-07-01 DIAGNOSIS — M85.80 OTHER SPECIFIED DISORDERS OF BONE DENSITY AND STRUCTURE, UNSPECIFIED SITE: ICD-10-CM

## 2019-07-01 DIAGNOSIS — Z51.81 ENCOUNTER FOR THERAPEUTIC DRUG LEVEL MONITORING: ICD-10-CM

## 2019-08-15 ENCOUNTER — APPOINTMENT (OUTPATIENT)
Dept: SURGERY | Facility: CLINIC | Age: 74
End: 2019-08-15
Payer: MEDICARE

## 2019-08-15 VITALS — WEIGHT: 167 LBS | BODY MASS INDEX: 25.39 KG/M2

## 2019-08-15 VITALS — BODY MASS INDEX: 24.86 KG/M2 | HEIGHT: 68 IN | WEIGHT: 164 LBS

## 2019-08-15 DIAGNOSIS — M62.08 SEPARATION OF MUSCLE (NONTRAUMATIC), OTHER SITE: ICD-10-CM

## 2019-08-15 PROCEDURE — 99204 OFFICE O/P NEW MOD 45 MIN: CPT

## 2019-08-15 NOTE — PHYSICAL EXAM
[Normal Breath Sounds] : Normal breath sounds [No Rash or Lesion] : No rash or lesion [Calm] : calm [Alert] : alert [JVD] : no jugular venous distention  [de-identified] : healthy [de-identified] : normal [de-identified] : mildly protuberant abdomen, moderate diastasis recti\par  [de-identified] : large tender reducible supraumbilical ventral hernia

## 2019-08-15 NOTE — CONSULT LETTER
[Dear  ___] : Dear  [unfilled], [Courtesy Letter:] : I had the pleasure of seeing your patient, [unfilled], in my office today. [Please see my note below.] : Please see my note below. [Consult Closing:] : Thank you very much for allowing me to participate in the care of this patient.  If you have any questions, please do not hesitate to contact me. [DrChandler  ___] : Dr. STARR [DrChandler ___] : Dr. STARR [___] : [unfilled] [FreeTextEntry3] : Respectfully,\par \par Aidan Jain M.D., FACS\par

## 2019-08-15 NOTE — ASSESSMENT
[FreeTextEntry1] : Norah is a pleasant 74-year-old woman with a past medical history significant for diabetes, autoimmune disease causing significant pain and and foot neuropathies and contractions, chronic constipation, glaucoma, kidney stones and right breast cancer along with a hysterectomy in the past who presents to the office with her  complaining of pain and swelling in the mid abdomen suspicious for hernia. She apparently had a hernia in the region above the umbilicus for some time but 3 weeks ago she developed extreme pain and tenderness in this area which lasted for several hours prompting a physician friend of hers to come to her home and manually reduce the incarcerated contents. She was very fortunate that he was able to do this as her symptoms were suspicious for an impending strangulation and could have resulted in major middle of the night emergency surgery which is often fraught with complications. She presents to the office today for surgical evaluation and to discuss possible elective surgery.\par \par Physical examination demonstrates a large near grapefruit-sized bulge several fingerbreadths above the umbilicus which is tender to palpation but reducible with a moderate degree of difficulty consistent with a large symptomatic ventral hernia warranting surgical repair. There is no evidence of incarceration or strangulation, and the patient denies any symptoms of obstruction.  She does have a moderate diastasis recti which is most likely related to 4 previous full-term pregnancies in the past. Her current BMI is 25.\par \par I explained the pros and cons of surgery, as well as all risks, benefits, indications and alternatives of the procedure and the patient understood and agreed. Norah scheduled for the repair of her large ventral hernia with mesh on Wednesday, September 25, 2019 under local with IV sed[facility] with presurgical testing preceding this date. The patient was encouraged to avoid heavy lifting and strenuous activity in the interim, of course.\par \par I recommended she purchase and wear an abdominal binder in the interim and she will use mineral oil to help keep her bowel movements regular. She does take medication on a regular basis for her chronic hand and foot pain in the form of methadone and gabapentin and I recommended she followup with Dr. Baumann (who manages her pain) with regard to her postoperative pain management regimen.

## 2019-08-26 ENCOUNTER — APPOINTMENT (OUTPATIENT)
Dept: NEUROLOGY | Facility: CLINIC | Age: 74
End: 2019-08-26

## 2019-09-10 ENCOUNTER — FORM ENCOUNTER (OUTPATIENT)
Age: 74
End: 2019-09-10

## 2019-09-11 ENCOUNTER — OUTPATIENT (OUTPATIENT)
Dept: OUTPATIENT SERVICES | Facility: HOSPITAL | Age: 74
LOS: 1 days | Discharge: HOME | End: 2019-09-11
Payer: MEDICARE

## 2019-09-11 VITALS
DIASTOLIC BLOOD PRESSURE: 62 MMHG | WEIGHT: 162.92 LBS | RESPIRATION RATE: 18 BRPM | OXYGEN SATURATION: 99 % | HEART RATE: 55 BPM | HEIGHT: 68 IN | SYSTOLIC BLOOD PRESSURE: 135 MMHG | TEMPERATURE: 97 F

## 2019-09-11 DIAGNOSIS — Z90.710 ACQUIRED ABSENCE OF BOTH CERVIX AND UTERUS: Chronic | ICD-10-CM

## 2019-09-11 DIAGNOSIS — K43.9 VENTRAL HERNIA WITHOUT OBSTRUCTION OR GANGRENE: ICD-10-CM

## 2019-09-11 DIAGNOSIS — Z98.890 OTHER SPECIFIED POSTPROCEDURAL STATES: Chronic | ICD-10-CM

## 2019-09-11 DIAGNOSIS — Z01.818 ENCOUNTER FOR OTHER PREPROCEDURAL EXAMINATION: ICD-10-CM

## 2019-09-11 DIAGNOSIS — M21.949 UNSPECIFIED ACQUIRED DEFORMITY OF HAND, UNSPECIFIED HAND: Chronic | ICD-10-CM

## 2019-09-11 LAB
ALBUMIN SERPL ELPH-MCNC: 4.1 G/DL — SIGNIFICANT CHANGE UP (ref 3.5–5.2)
ALP SERPL-CCNC: 83 U/L — SIGNIFICANT CHANGE UP (ref 30–115)
ALT FLD-CCNC: 14 U/L — SIGNIFICANT CHANGE UP (ref 0–41)
ANION GAP SERPL CALC-SCNC: 11 MMOL/L — SIGNIFICANT CHANGE UP (ref 7–14)
APPEARANCE UR: CLEAR — SIGNIFICANT CHANGE UP
APTT BLD: 31.5 SEC — SIGNIFICANT CHANGE UP (ref 27–39.2)
AST SERPL-CCNC: 16 U/L — SIGNIFICANT CHANGE UP (ref 0–41)
BASOPHILS # BLD AUTO: 0.02 K/UL — SIGNIFICANT CHANGE UP (ref 0–0.2)
BASOPHILS NFR BLD AUTO: 0.5 % — SIGNIFICANT CHANGE UP (ref 0–1)
BILIRUB SERPL-MCNC: 0.3 MG/DL — SIGNIFICANT CHANGE UP (ref 0.2–1.2)
BILIRUB UR-MCNC: NEGATIVE — SIGNIFICANT CHANGE UP
BUN SERPL-MCNC: 21 MG/DL — HIGH (ref 10–20)
CALCIUM SERPL-MCNC: 9.4 MG/DL — SIGNIFICANT CHANGE UP (ref 8.5–10.1)
CHLORIDE SERPL-SCNC: 103 MMOL/L — SIGNIFICANT CHANGE UP (ref 98–110)
CO2 SERPL-SCNC: 27 MMOL/L — SIGNIFICANT CHANGE UP (ref 17–32)
COLOR SPEC: YELLOW — SIGNIFICANT CHANGE UP
CREAT SERPL-MCNC: 0.5 MG/DL — LOW (ref 0.7–1.5)
DIFF PNL FLD: NEGATIVE — SIGNIFICANT CHANGE UP
EOSINOPHIL # BLD AUTO: 0.14 K/UL — SIGNIFICANT CHANGE UP (ref 0–0.7)
EOSINOPHIL NFR BLD AUTO: 3.4 % — SIGNIFICANT CHANGE UP (ref 0–8)
ESTIMATED AVERAGE GLUCOSE: 140 MG/DL — HIGH (ref 68–114)
GLUCOSE SERPL-MCNC: 160 MG/DL — HIGH (ref 70–99)
GLUCOSE UR QL: NEGATIVE — SIGNIFICANT CHANGE UP
HBA1C BLD-MCNC: 6.5 % — HIGH (ref 4–5.6)
HCT VFR BLD CALC: 39.7 % — SIGNIFICANT CHANGE UP (ref 37–47)
HGB BLD-MCNC: 13.2 G/DL — SIGNIFICANT CHANGE UP (ref 12–16)
IMM GRANULOCYTES NFR BLD AUTO: 0.7 % — HIGH (ref 0.1–0.3)
INR BLD: 0.94 RATIO — SIGNIFICANT CHANGE UP (ref 0.65–1.3)
KETONES UR-MCNC: NEGATIVE — SIGNIFICANT CHANGE UP
LEUKOCYTE ESTERASE UR-ACNC: NEGATIVE — SIGNIFICANT CHANGE UP
LYMPHOCYTES # BLD AUTO: 0.81 K/UL — LOW (ref 1.2–3.4)
LYMPHOCYTES # BLD AUTO: 19.8 % — LOW (ref 20.5–51.1)
MCHC RBC-ENTMCNC: 30.2 PG — SIGNIFICANT CHANGE UP (ref 27–31)
MCHC RBC-ENTMCNC: 33.2 G/DL — SIGNIFICANT CHANGE UP (ref 32–37)
MCV RBC AUTO: 90.8 FL — SIGNIFICANT CHANGE UP (ref 81–99)
MONOCYTES # BLD AUTO: 0.43 K/UL — SIGNIFICANT CHANGE UP (ref 0.1–0.6)
MONOCYTES NFR BLD AUTO: 10.5 % — HIGH (ref 1.7–9.3)
NEUTROPHILS # BLD AUTO: 2.67 K/UL — SIGNIFICANT CHANGE UP (ref 1.4–6.5)
NEUTROPHILS NFR BLD AUTO: 65.1 % — SIGNIFICANT CHANGE UP (ref 42.2–75.2)
NITRITE UR-MCNC: NEGATIVE — SIGNIFICANT CHANGE UP
NRBC # BLD: 0 /100 WBCS — SIGNIFICANT CHANGE UP (ref 0–0)
PH UR: 6.5 — SIGNIFICANT CHANGE UP (ref 5–8)
PLATELET # BLD AUTO: 197 K/UL — SIGNIFICANT CHANGE UP (ref 130–400)
POTASSIUM SERPL-MCNC: 3.9 MMOL/L — SIGNIFICANT CHANGE UP (ref 3.5–5)
POTASSIUM SERPL-SCNC: 3.9 MMOL/L — SIGNIFICANT CHANGE UP (ref 3.5–5)
PROT SERPL-MCNC: 6.5 G/DL — SIGNIFICANT CHANGE UP (ref 6–8)
PROT UR-MCNC: SIGNIFICANT CHANGE UP
PROTHROM AB SERPL-ACNC: 10.8 SEC — SIGNIFICANT CHANGE UP (ref 9.95–12.87)
RBC # BLD: 4.37 M/UL — SIGNIFICANT CHANGE UP (ref 4.2–5.4)
RBC # FLD: 12.8 % — SIGNIFICANT CHANGE UP (ref 11.5–14.5)
SODIUM SERPL-SCNC: 141 MMOL/L — SIGNIFICANT CHANGE UP (ref 135–146)
SP GR SPEC: 1.02 — SIGNIFICANT CHANGE UP (ref 1.01–1.02)
UROBILINOGEN FLD QL: SIGNIFICANT CHANGE UP
WBC # BLD: 4.1 K/UL — LOW (ref 4.8–10.8)
WBC # FLD AUTO: 4.1 K/UL — LOW (ref 4.8–10.8)

## 2019-09-11 PROCEDURE — 71046 X-RAY EXAM CHEST 2 VIEWS: CPT | Mod: 26

## 2019-09-11 PROCEDURE — 93010 ELECTROCARDIOGRAM REPORT: CPT

## 2019-09-11 RX ORDER — METHADONE HYDROCHLORIDE 40 MG/1
1 TABLET ORAL
Qty: 0 | Refills: 0 | DISCHARGE

## 2019-09-11 RX ORDER — DORZOLAMIDE HYDROCHLORIDE 20 MG/ML
0 SOLUTION/ DROPS OPHTHALMIC
Qty: 0 | Refills: 0 | DISCHARGE

## 2019-09-11 RX ORDER — BIMATOPROST 0.3 MG/ML
1 SOLUTION/ DROPS OPHTHALMIC
Qty: 0 | Refills: 0 | DISCHARGE

## 2019-09-11 RX ORDER — INSULIN DEGLUDEC 100 U/ML
14 INJECTION, SOLUTION SUBCUTANEOUS
Qty: 0 | Refills: 0 | DISCHARGE

## 2019-09-11 NOTE — H&P PST ADULT - NSICDXPASTMEDICALHX_GEN_ALL_CORE_FT
PAST MEDICAL HISTORY:  Cancer uterine- 1980s, rt breast 2013-s/p chemo and radiation rx    Diabetes     Glaucoma     Hand deformity

## 2019-09-11 NOTE — H&P PST ADULT - NSICDXPASTSURGICALHX_GEN_ALL_CORE_FT
PAST SURGICAL HISTORY:  H/O abdominal hysterectomy     H/O lumpectomy rt with excision of lymph nodes

## 2019-09-13 PROBLEM — M21.949: Chronic | Status: ACTIVE | Noted: 2019-09-11

## 2019-09-20 ENCOUNTER — RX RENEWAL (OUTPATIENT)
Age: 74
End: 2019-09-20

## 2019-09-24 NOTE — ASU PATIENT PROFILE, ADULT - PMH
Cancer  uterine- 1980s, rt breast 2013-s/p chemo and radiation rx  Diabetes    Glaucoma    Hand deformity

## 2019-09-25 ENCOUNTER — APPOINTMENT (OUTPATIENT)
Dept: SURGERY | Facility: AMBULATORY SURGERY CENTER | Age: 74
End: 2019-09-25
Payer: MEDICARE

## 2019-09-25 ENCOUNTER — OUTPATIENT (OUTPATIENT)
Dept: OUTPATIENT SERVICES | Facility: HOSPITAL | Age: 74
LOS: 1 days | Discharge: HOME | End: 2019-09-25

## 2019-09-25 VITALS
SYSTOLIC BLOOD PRESSURE: 148 MMHG | HEIGHT: 68 IN | RESPIRATION RATE: 17 BRPM | WEIGHT: 162.92 LBS | OXYGEN SATURATION: 99 % | HEART RATE: 60 BPM | DIASTOLIC BLOOD PRESSURE: 84 MMHG | TEMPERATURE: 98 F

## 2019-09-25 VITALS
DIASTOLIC BLOOD PRESSURE: 70 MMHG | RESPIRATION RATE: 20 BRPM | HEART RATE: 61 BPM | OXYGEN SATURATION: 96 % | SYSTOLIC BLOOD PRESSURE: 162 MMHG

## 2019-09-25 DIAGNOSIS — Z90.710 ACQUIRED ABSENCE OF BOTH CERVIX AND UTERUS: Chronic | ICD-10-CM

## 2019-09-25 DIAGNOSIS — Z98.890 OTHER SPECIFIED POSTPROCEDURAL STATES: Chronic | ICD-10-CM

## 2019-09-25 LAB — GLUCOSE BLDC GLUCOMTR-MCNC: 125 MG/DL — HIGH (ref 70–99)

## 2019-09-25 PROCEDURE — 49568: CPT

## 2019-09-25 PROCEDURE — 49561: CPT

## 2019-09-25 RX ORDER — TIMOLOL 0.5 %
1 DROPS OPHTHALMIC (EYE)
Qty: 0 | Refills: 0 | DISCHARGE

## 2019-09-25 RX ORDER — ONDANSETRON 8 MG/1
4 TABLET, FILM COATED ORAL ONCE
Refills: 0 | Status: DISCONTINUED | OUTPATIENT
Start: 2019-09-25 | End: 2019-10-19

## 2019-09-25 RX ORDER — MORPHINE SULFATE 50 MG/1
2 CAPSULE, EXTENDED RELEASE ORAL ONCE
Refills: 0 | Status: DISCONTINUED | OUTPATIENT
Start: 2019-09-25 | End: 2019-09-25

## 2019-09-25 RX ORDER — ASCORBIC ACID 60 MG
1 TABLET,CHEWABLE ORAL
Qty: 0 | Refills: 0 | DISCHARGE

## 2019-09-25 RX ORDER — ANASTROZOLE 1 MG/1
1 TABLET ORAL
Qty: 0 | Refills: 0 | DISCHARGE

## 2019-09-25 RX ORDER — CHOLECALCIFEROL (VITAMIN D3) 125 MCG
1 CAPSULE ORAL
Qty: 0 | Refills: 0 | DISCHARGE

## 2019-09-25 RX ORDER — LATANOPROST 0.05 MG/ML
1 SOLUTION/ DROPS OPHTHALMIC; TOPICAL
Qty: 0 | Refills: 0 | DISCHARGE

## 2019-09-25 RX ORDER — GABAPENTIN 400 MG/1
1 CAPSULE ORAL
Qty: 0 | Refills: 0 | DISCHARGE

## 2019-09-25 RX ORDER — INSULIN DEGLUDEC 100 U/ML
12 INJECTION, SOLUTION SUBCUTANEOUS
Qty: 0 | Refills: 0 | DISCHARGE

## 2019-09-25 RX ORDER — SODIUM CHLORIDE 9 MG/ML
1000 INJECTION, SOLUTION INTRAVENOUS
Refills: 0 | Status: DISCONTINUED | OUTPATIENT
Start: 2019-09-25 | End: 2019-10-19

## 2019-09-25 RX ORDER — PREGABALIN 225 MG/1
1 CAPSULE ORAL
Qty: 0 | Refills: 0 | DISCHARGE

## 2019-09-25 RX ADMIN — SODIUM CHLORIDE 60 MILLILITER(S): 9 INJECTION, SOLUTION INTRAVENOUS at 12:09

## 2019-09-25 RX ADMIN — MORPHINE SULFATE 2 MILLIGRAM(S): 50 CAPSULE, EXTENDED RELEASE ORAL at 12:24

## 2019-09-25 NOTE — CHART NOTE - NSCHARTNOTEFT_GEN_A_CORE
PACU ANESTHESIA ADMISSION NOTE      Procedure: Repair of ventral hernia with mesh: Incarcerated    Post op diagnosis:  Ventral hernia      ____  Intubated  TV:______       Rate: ______      FiO2: ______    _x___  Patent Airway    _x___  Full return of protective reflexes    _x___  Full recovery from anesthesia / back to baseline status    Vitals:            T:  98.1              BP :155/72                R:18              Sat:100               P:64      Mental Status:  _x___ Awake   _____ Alert   _____ Drowsy   _____ Sedated    Nausea/Vomiting:  _x___  NO       ______Yes,   See Post - Op Orders         Pain Scale (0-10):  __0___    Treatment: _x___ None    ____ See Post - Op/PCA Orders    Post - Operative Fluids:   __x__ Oral   ____ See Post - Op Orders    Plan: Discharge:   _x___Home       _____Floor     _____Critical Care    _____  Other:_________________    Comments:  No anesthesia issues or complications noted.  Discharge when criteria met.

## 2019-09-25 NOTE — ASU DISCHARGE PLAN (ADULT/PEDIATRIC) - CARE PROVIDER_API CALL
Aidan Jain)  Surgery  [address] [facility] [address], [facility] [address]  [place] [address]  Phone: [phone]  Fax: [phone]  Follow Up Time: 1 week

## 2019-09-25 NOTE — ASU DISCHARGE PLAN (ADULT/PEDIATRIC) - ASU DC SPECIAL INSTRUCTIONSFT
Diet    Eat light on the day of surgery. Nausea and vomiting can occur after anesthesia,   but usually resolve within 24 hours.  Resume normal diet the following day.      Activity    Rest!  No heavy lifting or strenuous activity.    Medications    Naproxen (Aleve) and Extra-Strength Tylenol for pain.  Methadone for severe pain only.  You have this medication at home if needed.  Remember, methadone is a strong narcotic pain reliever which can cause drowsiness, upset stomach and constipation.  It should always be taken with food.  You can use stool softener (Mineral Oil) or laxative (MiraLax or Dulcolax) if constipated.  An antibiotic is given during surgery.  No antibiotic needed at home.   Resume all previous medications.  Resume blood thinners the day after surgery unless told otherwise.    Wound Care    Leave surgical dressing in place.  May shower (dressing is waterproof.)  No pool, ocean, lake, hot-tub or   bath for 3 weeks. If you were given an abdominal binder, please wear it as much as possible (day & night)   for 2 weeks, but you must remove it for showers.  Ice packs to the area intermittently for several days help   with pain and swelling.  Bruising (“black and blue”) is common.  Treatment is…Ice & Rest.

## 2019-09-30 DIAGNOSIS — E11.9 TYPE 2 DIABETES MELLITUS WITHOUT COMPLICATIONS: ICD-10-CM

## 2019-09-30 DIAGNOSIS — Z88.0 ALLERGY STATUS TO PENICILLIN: ICD-10-CM

## 2019-09-30 DIAGNOSIS — K43.6 OTHER AND UNSPECIFIED VENTRAL HERNIA WITH OBSTRUCTION, WITHOUT GANGRENE: ICD-10-CM

## 2019-09-30 DIAGNOSIS — Z92.21 PERSONAL HISTORY OF ANTINEOPLASTIC CHEMOTHERAPY: ICD-10-CM

## 2019-09-30 DIAGNOSIS — Z92.3 PERSONAL HISTORY OF IRRADIATION: ICD-10-CM

## 2019-09-30 DIAGNOSIS — H40.9 UNSPECIFIED GLAUCOMA: ICD-10-CM

## 2019-09-30 DIAGNOSIS — Z88.1 ALLERGY STATUS TO OTHER ANTIBIOTIC AGENTS STATUS: ICD-10-CM

## 2019-09-30 DIAGNOSIS — Z85.42 PERSONAL HISTORY OF MALIGNANT NEOPLASM OF OTHER PARTS OF UTERUS: ICD-10-CM

## 2019-09-30 DIAGNOSIS — Z85.3 PERSONAL HISTORY OF MALIGNANT NEOPLASM OF BREAST: ICD-10-CM

## 2019-10-03 ENCOUNTER — APPOINTMENT (OUTPATIENT)
Dept: SURGERY | Facility: CLINIC | Age: 74
End: 2019-10-03
Payer: MEDICARE

## 2019-10-03 VITALS — HEIGHT: 68 IN | WEIGHT: 167.8 LBS | BODY MASS INDEX: 25.43 KG/M2

## 2019-10-03 DIAGNOSIS — K43.9 VENTRAL HERNIA W/OUT OBSTRUCTION OR GANGRENE: ICD-10-CM

## 2019-10-03 PROCEDURE — 99024 POSTOP FOLLOW-UP VISIT: CPT

## 2019-10-03 NOTE — CONSULT LETTER
[FreeTextEntry1] : Dear Dr. Brynn Pena, \par \par I had the pleasure of seeing your patient, RIKI GANT, in my office today. Please see my note below. \par \par Thank you very much for allowing me to participate in the care of this patient. If you have any questions, please do not hesitate to contact me. \par \par \par Respectfully,\par \par Aidan Jain M.D., FACS\par  \par \par \par \par cc: Dr. Tyler Baumann \par Dr. Candido Palmer \par Dr. Brynn Pena \par Dr. Royal Zhou

## 2019-10-03 NOTE — ASSESSMENT
[FreeTextEntry1] : Norah underwent the repair of her very large incarcerated ventral hernia with mesh on September 25, 2019 under local with IV sedation without any problems or complications. Her wound is clean, dry and intact. There is no evidence of erythema, seroma formation or infection. She is tolerating a diet and having normal bowel movements. She denies any significant postoperative pain or discomfort at this time.\par \par She was counseled and reassured. She was discharged from the office with no specific followup necessary, but she knows to avoid any heavy lifting or strenuous activity for the next several weeks. The patient was encouraged to continue to wear an abdominal binder for the better part of the next month.

## 2019-10-09 ENCOUNTER — APPOINTMENT (OUTPATIENT)
Dept: NEUROLOGY | Facility: CLINIC | Age: 74
End: 2019-10-09

## 2019-10-30 ENCOUNTER — FORM ENCOUNTER (OUTPATIENT)
Age: 74
End: 2019-10-30

## 2019-10-30 DIAGNOSIS — R92.8 OTHER ABNORMAL AND INCONCLUSIVE FINDINGS ON DIAGNOSTIC IMAGING OF BREAST: ICD-10-CM

## 2019-10-31 ENCOUNTER — OUTPATIENT (OUTPATIENT)
Dept: OUTPATIENT SERVICES | Facility: HOSPITAL | Age: 74
LOS: 1 days | Discharge: HOME | End: 2019-10-31
Payer: MEDICARE

## 2019-10-31 DIAGNOSIS — Z98.890 OTHER SPECIFIED POSTPROCEDURAL STATES: Chronic | ICD-10-CM

## 2019-10-31 DIAGNOSIS — Z12.13 ENCOUNTER FOR SCREENING FOR MALIGNANT NEOPLASM OF SMALL INTESTINE: ICD-10-CM

## 2019-10-31 DIAGNOSIS — Z90.710 ACQUIRED ABSENCE OF BOTH CERVIX AND UTERUS: Chronic | ICD-10-CM

## 2019-10-31 PROCEDURE — 77067 SCR MAMMO BI INCL CAD: CPT | Mod: 26

## 2019-10-31 PROCEDURE — 77063 BREAST TOMOSYNTHESIS BI: CPT | Mod: 26

## 2019-11-04 ENCOUNTER — FORM ENCOUNTER (OUTPATIENT)
Age: 74
End: 2019-11-04

## 2019-11-04 PROBLEM — R92.8 ABNORMAL FINDING ON BREAST IMAGING: Status: ACTIVE | Noted: 2019-11-04

## 2019-11-05 ENCOUNTER — OUTPATIENT (OUTPATIENT)
Dept: OUTPATIENT SERVICES | Facility: HOSPITAL | Age: 74
LOS: 1 days | Discharge: HOME | End: 2019-11-05
Payer: MEDICARE

## 2019-11-05 DIAGNOSIS — Z98.890 OTHER SPECIFIED POSTPROCEDURAL STATES: Chronic | ICD-10-CM

## 2019-11-05 DIAGNOSIS — Z90.710 ACQUIRED ABSENCE OF BOTH CERVIX AND UTERUS: Chronic | ICD-10-CM

## 2019-11-05 DIAGNOSIS — R92.8 OTHER ABNORMAL AND INCONCLUSIVE FINDINGS ON DIAGNOSTIC IMAGING OF BREAST: ICD-10-CM

## 2019-11-05 PROCEDURE — G0279: CPT | Mod: 26,RT

## 2019-11-05 PROCEDURE — 76642 ULTRASOUND BREAST LIMITED: CPT | Mod: 26,RT

## 2019-11-05 PROCEDURE — 77065 DX MAMMO INCL CAD UNI: CPT | Mod: 26,RT

## 2019-12-18 ENCOUNTER — LABORATORY RESULT (OUTPATIENT)
Age: 74
End: 2019-12-18

## 2019-12-18 ENCOUNTER — OUTPATIENT (OUTPATIENT)
Dept: OUTPATIENT SERVICES | Facility: HOSPITAL | Age: 74
LOS: 1 days | Discharge: HOME | End: 2019-12-18

## 2019-12-18 ENCOUNTER — APPOINTMENT (OUTPATIENT)
Dept: HEMATOLOGY ONCOLOGY | Facility: CLINIC | Age: 74
End: 2019-12-18
Payer: MEDICARE

## 2019-12-18 VITALS
DIASTOLIC BLOOD PRESSURE: 91 MMHG | SYSTOLIC BLOOD PRESSURE: 126 MMHG | WEIGHT: 178 LBS | TEMPERATURE: 96.5 F | HEIGHT: 67 IN | BODY MASS INDEX: 27.94 KG/M2 | HEART RATE: 62 BPM

## 2019-12-18 DIAGNOSIS — Z98.890 OTHER SPECIFIED POSTPROCEDURAL STATES: Chronic | ICD-10-CM

## 2019-12-18 DIAGNOSIS — Z90.710 ACQUIRED ABSENCE OF BOTH CERVIX AND UTERUS: Chronic | ICD-10-CM

## 2019-12-18 LAB
ALBUMIN SERPL ELPH-MCNC: 4.1 G/DL
ALP BLD-CCNC: 91 U/L
ALT SERPL-CCNC: 15 U/L
ANION GAP SERPL CALC-SCNC: 14 MMOL/L
AST SERPL-CCNC: 17 U/L
BILIRUB SERPL-MCNC: 0.4 MG/DL
BUN SERPL-MCNC: 21 MG/DL
CALCIUM SERPL-MCNC: 9.8 MG/DL
CANCER AG15-3 SERPL-ACNC: 12.4 U/ML
CEA SERPL-MCNC: 2.7 NG/ML
CHLORIDE SERPL-SCNC: 102 MMOL/L
CO2 SERPL-SCNC: 24 MMOL/L
CREAT SERPL-MCNC: 0.6 MG/DL
GLUCOSE SERPL-MCNC: 121 MG/DL
HCT VFR BLD CALC: 42.2 %
HGB BLD-MCNC: 13.7 G/DL
MCHC RBC-ENTMCNC: 30 PG
MCHC RBC-ENTMCNC: 32.5 G/DL
MCV RBC AUTO: 92.3 FL
PLATELET # BLD AUTO: 193 K/UL
PMV BLD: 9.6 FL
POTASSIUM SERPL-SCNC: 4.2 MMOL/L
PROT SERPL-MCNC: 7.1 G/DL
RBC # BLD: 4.57 M/UL
RBC # FLD: 12.4 %
SODIUM SERPL-SCNC: 140 MMOL/L
WBC # FLD AUTO: 5.35 K/UL

## 2019-12-18 PROCEDURE — 99214 OFFICE O/P EST MOD 30 MIN: CPT

## 2019-12-27 LAB
ALBUMIN SERPL ELPH-MCNC: 4.2 G/DL
ALP BLD-CCNC: 117 U/L
ALT SERPL-CCNC: 15 U/L
ANION GAP SERPL CALC-SCNC: 14 MMOL/L
AST SERPL-CCNC: 19 U/L
BILIRUB SERPL-MCNC: 0.3 MG/DL
BUN SERPL-MCNC: 12 MG/DL
CALCIUM SERPL-MCNC: 9.8 MG/DL
CANCER AG15-3 SERPL-ACNC: 14.4 U/ML
CEA SERPL-MCNC: 2.7 NG/ML
CHLORIDE SERPL-SCNC: 98 MMOL/L
CO2 SERPL-SCNC: 27 MMOL/L
CREAT SERPL-MCNC: 0.5 MG/DL
GLUCOSE SERPL-MCNC: 142 MG/DL
HCT VFR BLD CALC: 41.5 %
HGB BLD-MCNC: 13.4 G/DL
MCHC RBC-ENTMCNC: 29.6 PG
MCHC RBC-ENTMCNC: 32.3 G/DL
MCV RBC AUTO: 91.8 FL
PLATELET # BLD AUTO: 214 K/UL
PMV BLD: 9.3 FL
POTASSIUM SERPL-SCNC: 4.1 MMOL/L
PROT SERPL-MCNC: 7 G/DL
RBC # BLD: 4.52 M/UL
RBC # FLD: 12.9 %
SODIUM SERPL-SCNC: 139 MMOL/L
WBC # FLD AUTO: 4.73 K/UL

## 2019-12-27 NOTE — HISTORY OF PRESENT ILLNESS
[de-identified] : 73-year-old female is here today for a followup visit.  She has a history of locally advanced right-sided breast cancer and initially presented with multiple right axillary lymphadenopathy and right supraclavicular lymphadenopathy at the time of diagnosis in 2013.  The biopsy of the right breast mass revealed infiltrating poorly differentiated ductal carcinoma, ER/ME positive and HER2/asmita negative.  Her initial PET/CT showed FDGavid right axillary and right supraclavicular lymphadenopathy as well as FDGavid breast mass.  There was no evidence of distant metastasis.  She received neoadjuvant chemotherapy with dose-dense Adriamycin and Cytoxan for four cycles followed by paclitaxel every other week for four cycles.  She finished her chemotherapy in 10/2013.  She had a good partial response.  In 01/2014, she had a right breast lumpectomy and right axillary lymph node dissection.  The pathology revealed residual invasive poorly differentiated ductal carcinoma measuring 1.1cm.  There was no lymphovascular or perineural invasion.  A 4/23 lymph nodes was positive for metastatic carcinoma with focal extranodal extension.  The AJCC Seventh Edition Pathologic Stage after neoadjuvant chemotherapy was IIIA (ypT1c N2a M0).   After surgery, she received adjuvant breast radiotherapy.  Subsequently, she started on adjuvant endocrine therapy initially with letrozole, followed by exemestane.  She had a lot of body aching and fatigue from the medication.  Eventually, she switched to anastrozole, and has been tolerating it better. The patient also has a history of osteoarthropathy in her hands and feet.  She has stiffness in her finger joints and cannot extend her fingers.  She saw several rheumatologists; however, did not have a clear diagnosis.  The patient has been seeing Dr. Greenfield for pain management.  In 10/23/17 she had a bilateral diagnostic mammogram and breast ultrasound which did not reveal suspicious finding. In September 2016 she had a bone density which showed osteopenia in the hip. Her bone density in the spine and the femoral neck was normal. She has been taking calcium and vitamin D supplements. \par \par \par  [de-identified] : In 10/2017, b/l dx mammo did not reveal suspicious finding.\par Interim. she saw Dr. Raman and had her port removed.\par She has arthritis and chronic joint pain and stiffness. She is taking methadone for pain. \par \par 12/20/18:\par The patient is here for followup visit. She has been taking Anastrozole daily. She had b/l screening mammo in 10/2018. There was no suspicious finding. Her last bone density was in 9/2016. She still has pain and stiffness in her hands.\par \par 6/26/19\par Patient is here today for follow up visit.  She has h/o stage IIIA breast cancer on AI since 1/2014, initially with Letrozole, then Exemestane, now Anastrozole.  She is c/o fatigue, weakness.  She is taking Methadone 10 mg BID and Gabapentin 600 mg PO q HS for pain.  Labs reviewed.  Hgb=13 on 12/2018. Last mammogram was done 10/2018 shows no malignancy.  Bone density 1/2019 shows osteopenia which is improving prior to last one. She follows up with her PCP.\par \par 12/18/19\par Patient is here today for follow up visit.  She has h/o stage IIIA breast cancer on AI since 1/2014, initially with Letrozole, then Exemestane, now Anastrozole.  She had right breast dx mammo and US in 11/2019 which did not show suspicious finding.  She has chronic arthropathy and chronic pain. She is taking Methadone 10 mg BID and Gabapentin 600 mg PO q HS for pain.  Labs reviewed. Bone density 1/2019 shows osteopenia which is improving prior to last one.

## 2019-12-27 NOTE — ASSESSMENT
[FreeTextEntry1] : ER/[place] positive and HER2/asmita negative advanced right-sided breast cancer, status post neoadjuvant chemotherapy followed by right breast lumpectomy and right axillary lymph node dissection, adjuvant breast radiotherapy; currently on adjuvant endocrine therapy.  There is no evidence of recurrent disease.\par \par RECOMMENDATION: \par -- Continue anastrozole, calcium and vitamin D supplement. She will benefit with extended endocrine therapy due to h/o locally advanced breast cancer.\par -- Continue annual screening mammogram. Due 10/2019\par --  Blood workup today for CBC, CMP and tumor markers.\par -- She will come back for followup visit in six months. \par -- She will follow up with her PCP for her other health care issues.\par \par

## 2019-12-27 NOTE — PHYSICAL EXAM
[Fully active, able to carry on all pre-disease performance without restriction] : Status 0 - Fully active, able to carry on all pre-disease performance without restriction [Normal] : affect appropriate [de-identified] : The right breast is status post lumpectomy. There is no palpable mass, skin lesion and no palpable right axillary lymphadenopathy. Left breast and left axilla is normal. [de-identified] : Chronic osteoarthropathy in her both hands.

## 2019-12-30 ENCOUNTER — APPOINTMENT (OUTPATIENT)
Dept: BREAST CENTER | Facility: CLINIC | Age: 74
End: 2019-12-30
Payer: MEDICARE

## 2019-12-30 VITALS
WEIGHT: 178 LBS | SYSTOLIC BLOOD PRESSURE: 122 MMHG | HEIGHT: 67 IN | BODY MASS INDEX: 27.94 KG/M2 | DIASTOLIC BLOOD PRESSURE: 70 MMHG | TEMPERATURE: 97.1 F

## 2019-12-30 PROCEDURE — 99213 OFFICE O/P EST LOW 20 MIN: CPT

## 2019-12-30 NOTE — PAST MEDICAL HISTORY
[Surgical Menopause] : The patient is in surgical menopause [Total Preg ___] : G[unfilled] [Menarche Age ____] : age at menarche was [unfilled] [Age At Live Birth ___] : Age at live birth: [unfilled] [FreeTextEntry6] : No. [FreeTextEntry7] : No. [FreeTextEntry8] : No.

## 2019-12-30 NOTE — DATA REVIEWED
[FreeTextEntry1] : EXAM: MG MAMMO SCREEN W KUN BI#\par \par \par PROCEDURE DATE: 10/31/2019\par \par \par \par INTERPRETATION: HISTORY:\par Bilateral MG MAMMO SCREEN W KUN BI# was performed. Patient is 74 years old\par and is seen for screening. The patient has a history of Uterine/Endometrial\par at age 40. The patient has a history of right lumpectomy in January, 2014 -\par malignant and bilateral needle biopsy at age 59. The patient has the\par following family history of breast cancer: maternal aunt, at age 50, breast\par cancer.\par \par RISK ASSESSMENT:\par Maneer-Zackck Lifetime Risk: 3.0\par \par CLINICAL BREAST EXAM:\par The patient reports her last clinical breast exam was performed within the\par past year.\par \par COMPARISON STUDIES:\par The present examination has been compared to prior imaging studies performed\par at [facility] on 02/05/2016, 10/21/2016, 10/23/2017\par and 10/25/2018.\par \par MAMMOGRAM FINDINGS:\par Mammography was performed including the following views: bilateral\par craniocaudal with tomosynthesis, bilateral mediolateral oblique with\par tomosynthesis, and right mediolateral oblique. The examination includes\par digital synthetic 2D and digital tomosynthesis 3D images. Additional imaging\par analysis was performed using CAD (computer-aided detection) software.\par \par There are scattered areas of fibroglandular density.\par \par Finding 1: There is an apparent asymmetry seen in the right breast.\par \par Finding 2: There is an area of architectural distortion at the site of\par lumpectomy seen in the right breast.\par \par No suspicious mass, grouping of calcifications, or other abnormality is\par identified in the left breast.\par \par IMPRESSION:\par Finding 1: Apparent asymmetry in the right breast requires additional\par evaluation.\par \par RECOMMENDATION:\par Patient will be recalled for additional mammographic views and, if\par indicated, breast ultrasound.\par \par Finding 2: Area of architectural distortion at the site of lumpectomy in\par the right breast is benign finding.\par \par ASSESSMENT:\par BI-RADS Category 0: Incomplete: Needs Additional Imaging Evaluation\par \par The patient will be notified of these results by telephone, and will also be\par mailed a written summary in layman's terms.\par \par \par \par \par \par \par \par \par HUGO LYNNE M.D., ATTENDING RADIOLOGIST\par This document has been electronically signed. Nov 1 2019 1:39PM\par \par EXAM: US BREAST LIMITED RT\par EXAM: MG MAMMO DIAG W KUN RT#\par \par \par PROCEDURE DATE: 11/05/2019\par \par \par \par INTERPRETATION: Clinical History / Reason for exam: Callback from screening\par mammogram\par \par The patient reports her last clinical breast examination was performed\par within the past year.\par \par Family history: Maternal aunt\par \par Comparisons: Priors dating back to 2013.\par \par Views obtained:Full-field ML and spot compression views of the right breast.\par \par Computer-aided detection was utilized in the interpretation of this\par examination.\par \par Breast composition:There are scattered areas of fibroglandular density.\par \par Findings:\par \par Mammogram:\par \par Linear marker denotes the site of cutaneous scarring in the right breast.\par There are stable postsurgical distortion at the lumpectomy site. There is a\par subcentimeter persistent asymmetry which corresponds with a benign cyst seen\par on concurrent ultrasound. No suspicious mass, microcalcifications or areas\par of architectural distortion is seen the right breast. When compared to the\par previous examinations there is no significant interval change and nothing to\par suggest malignancy.\par \par Ultrasound:\par \par Targeted unilateral right breast ultrasound was performed.\par \par At the retroareolar region, there is a 0.6 x 0.4 x 0.2 cm benign cyst\par corresponding to mammographic findings.\par \par Impression: No mammographic or sonographic evidence of malignancy.\par \par Recommendation: Unless otherwise indicated by clinical findings, annual\par screening mammography recommended.\par \par BI-RADS Category 2: Benign\par \par \par The above findings and recommendations were discussed with the patient at\par the time of the examination.\par \par \par \par \par \par \par HUGO LYNNE M.D., ATTENDING RADIOLOGIST\par This document has been electronically signed. Nov 5 2019 6:39PM\par \par

## 2019-12-30 NOTE — REVIEW OF SYSTEMS
[Joint Pain] : joint pain [As Noted in HPI] : as noted in HPI [Joint Stiffness] : joint stiffness [Breast Pain] : breast pain [Negative] : Heme/Lymph [Skin Lesions] : no skin lesions [Skin Wound] : no skin wound [Breast Lump] : no breast lump

## 2019-12-30 NOTE — REASON FOR VISIT
[Follow-Up: _____] : a [unfilled] follow-up visit [FreeTextEntry1] : right breast cancer. short term follow up

## 2019-12-30 NOTE — ASSESSMENT
[FreeTextEntry1] : RIKI is a kelin 73 year old patient who presented today in follow up for a history of Right locally advanced breast cancer, treated in 2013, [id number], ER/MT +, Her 2 neg. She is s/p Left chest chemoport removal on 5/29/18.  \par \par -s/p neoadjuvant chemotherapy with dd AC + T\par -s/p R breast WBI\par -s/p AI therapy \par \par In regards to her cancer treatment: she has been doing well with no complaints, and is currently on a Anastrazole, tolerating it well. \par \par There was no evidence of disease recurrence at this time.  Her most recent imaging was a b/l mammogram on 10/31/19 which revealed an apparent asymmetry in her right breast, deemed BIRADS 0.  She had a R dx mammogram and US on 11/5/19 which revealed that this asymmetry persisted and correlated with a benign cyst in the retroareolar region, measuring 6 x 4 x 2 mm, deemed BIRADS 2.  Her next screening mammogram will be due on 10/31/2020.  This was scheduled for her. I will have her follow up after for a CBE.   \par \par In regards to her undergoing right hand surgery to release some of her contracted tendons from severe arthritis, I have discussed her increased risk of healing complications related to her full axillary lymph node dissection in addition to radiation therapy on the right side.  SHe was provided with her initial pathology report to provide to her surgeon so that she can make a decision regarding this surgery. \par \par As review, RIKI was diagnosed with a locally advanced right breast carcinoma and was treated with neoadjuvant chemotherapy with ddACT followed by breast conservation surgery and axillary Lymph node dissection, [id number], ER/MT positive, Her 2 asmita negative.  She also underwent whole breast breast radiation and is currently on Anastrazole. \par \par All questions were answered and she was instructed to call with any further concerns. \par \par PLAN\par -f/up in 1 year after yearly imaging\par -tolerating AI, continue per Dr. Garcia.

## 2019-12-30 NOTE — PHYSICAL EXAM
[Normocephalic] : normocephalic [Atraumatic] : atraumatic [EOMI] : extra ocular movement intact [No Supraclavicular Adenopathy] : no supraclavicular adenopathy [No Cervical Adenopathy] : no cervical adenopathy [Examined in the supine and seated position] : examined in the supine and seated position [No dominant masses] : no dominant masses left breast [No Nipple Retraction] : no left nipple retraction [No Nipple Discharge] : no left nipple discharge [No Axillary Lymphadenopathy] : no left axillary lymphadenopathy [Soft] : abdomen soft [Not Tender] : non-tender [No Edema] : no edema [No Swelling] : no swelling [No Rashes] : no rashes [No Ulceration] : no ulceration [de-identified] : post radiation changes, no suspicious lesions palpated in either breast, surgical incision is well healed with scar retraction and some thickened scar tissue  [de-identified] : no suspicious mass palpated

## 2019-12-30 NOTE — HISTORY OF PRESENT ILLNESS
[FreeTextEntry1] : Problem List:\par 1. Stage IIIA (ypT1c N2a M0) Right breast cancer, 1.1cm, ER/[place] (+). HER2 (-).\par 2. s/p neoadjuvant chemotx. (Dr. Garcia) ACT\par 3. s/p Right NG Lumpectomy / AND - 01/14/14.\par 4. s/p adjuvant breast RT. (Dr. Epps).\par 5. currently on Anastrozole.\par 6. h/o osteoarthropathy in her hands and feet\par 7. s/p chemoport removal on 5/29/18\par \par RIKI GANT is a 74 year old female patient with history of right breast locally advanced cancer, who presents today for cancer follow up visit.\par \par INTERVAL HISTORY: \brionna Almazan returns for a short term follow up for locally advanced right breast cancer, treated in 2013.  \par She is continues to have increased sensitivity in her right axilla at her surgical site, but otherwise does not have any other areas of pain. She is not really able to self examinations due to severe arthritis in both hands but denies any nipple discharge or retraction.  Her most recent imaging was a b/l mammogram on 10/31/19 which revealed an apparent asymmetry in her right breast, deemed BIRADS 0.  She had a R dx mammogram and US on 11/5/19 which revealed that this asymmetry persisted and correlated with a benign cyst in the retroareolar region, measuring 6 x 4 x 2 mm, deemed BIRADS 2.  \par \par Of note she did not have to undergo surgery for her right humerus fracture and it has healed with conservative management.  She does not have any worsening arm swelling at this time.

## 2020-01-06 DIAGNOSIS — C50.911 MALIGNANT NEOPLASM OF UNSPECIFIED SITE OF RIGHT FEMALE BREAST: ICD-10-CM

## 2020-01-06 DIAGNOSIS — Z79.811 LONG TERM (CURRENT) USE OF AROMATASE INHIBITORS: ICD-10-CM

## 2020-01-25 RX ORDER — METHADONE HYDROCHLORIDE 40 MG/1
1 TABLET ORAL
Qty: 0 | Refills: 0 | DISCHARGE

## 2020-04-13 NOTE — H&P PST ADULT - FUNCTIONAL SCREEN CURRENT LEVEL: AMBULATION, MLM
"-- Marguerite Leavitt (Self) [phone] (H)          Alternative phone number: [phone]    Turnaround time given to caller: ""This message will be sent to [facility] Provider's name]. The clinical team will fulfill your request as soon as they review your message. \""    "
Patient called back with fax and I faxed.
See attached--did you ever get a place to send the referral
(0) independent

## 2020-06-18 ENCOUNTER — APPOINTMENT (OUTPATIENT)
Dept: HEMATOLOGY ONCOLOGY | Facility: CLINIC | Age: 75
End: 2020-06-18
Payer: MEDICARE

## 2020-06-18 PROCEDURE — 99213 OFFICE O/P EST LOW 20 MIN: CPT | Mod: 95

## 2020-06-20 NOTE — CONSULT LETTER
[Dear  ___] : Dear  [unfilled], [Please see my note below.] : Please see my note below. [Courtesy Letter:] : I had the pleasure of seeing your patient, [unfilled], in my office today. [FreeTextEntry3] : Clemencia Garcia MD [Sincerely,] : Sincerely,

## 2020-06-20 NOTE — ASSESSMENT
[FreeTextEntry1] : ER/[place] positive and HER2/asmita negative advanced right-sided breast cancer, status post neoadjuvant chemotherapy followed by right breast lumpectomy and right axillary lymph node dissection, adjuvant breast radiotherapy; currently on adjuvant endocrine therapy.  There is no evidence of recurrent disease.\par \par RECOMMENDATION: \par -- Continue anastrozole, calcium and vitamin D supplement. She will benefit with extended endocrine therapy due to h/o locally advanced breast cancer.\par -- Continue annual screening mammogram. Due 11/2020.\par -- She will come back for followup visit in six months. \par -- She will follow up with her PCP for her other health care issues.\par \par

## 2020-06-20 NOTE — HISTORY OF PRESENT ILLNESS
[Verbal consent obtained from patient] : the patient, [unfilled] [Medical Office: ([facility])___] : at the medical office located in  [Home] : at home, [unfilled] , at the time of the visit. [de-identified] : 73-year-old female is here today for a followup visit.  She has a history of locally advanced right-sided breast cancer and initially presented with multiple right axillary lymphadenopathy and right supraclavicular lymphadenopathy at the time of diagnosis in 2013.  The biopsy of the right breast mass revealed infiltrating poorly differentiated ductal carcinoma, ER/PA positive and HER2/asmita negative.  Her initial PET/CT showed FDGavid right axillary and right supraclavicular lymphadenopathy as well as FDGavid breast mass.  There was no evidence of distant metastasis.  She received neoadjuvant chemotherapy with dose-dense Adriamycin and Cytoxan for four cycles followed by paclitaxel every other week for four cycles.  She finished her chemotherapy in 10/2013.  She had a good partial response.  In 01/2014, she had a right breast lumpectomy and right axillary lymph node dissection.  The pathology revealed residual invasive poorly differentiated ductal carcinoma measuring 1.1cm.  There was no lymphovascular or perineural invasion.  A 4/23 lymph nodes was positive for metastatic carcinoma with focal extranodal extension.  The AJCC Seventh Edition Pathologic Stage after neoadjuvant chemotherapy was IIIA (ypT1c N2a M0).   After surgery, she received adjuvant breast radiotherapy.  Subsequently, she started on adjuvant endocrine therapy initially with letrozole, followed by exemestane.  She had a lot of body aching and fatigue from the medication.  Eventually, she switched to anastrozole, and has been tolerating it better. The patient also has a history of osteoarthropathy in her hands and feet.  She has stiffness in her finger joints and cannot extend her fingers.  She saw several rheumatologists; however, did not have a clear diagnosis.  The patient has been seeing Dr. Greenfield for pain management.  In 10/23/17 she had a bilateral diagnostic mammogram and breast ultrasound which did not reveal suspicious finding. In September 2016 she had a bone density which showed osteopenia in the hip. Her bone density in the spine and the femoral neck was normal. She has been taking calcium and vitamin D supplements. \par \par \par  [de-identified] : In 10/2017, b/l dx mammo did not reveal suspicious finding.\par Interim. she saw Dr. Raman and had her port removed.\par She has arthritis and chronic joint pain and stiffness. She is taking methadone for pain. \par \par 12/20/18:\par The patient is here for followup visit. She has been taking Anastrozole daily. She had b/l screening mammo in 10/2018. There was no suspicious finding. Her last bone density was in 9/2016. She still has pain and stiffness in her hands.\par \par 6/26/19\par Patient is here today for follow up visit.  She has h/o stage IIIA breast cancer on AI since 1/2014, initially with Letrozole, then Exemestane, now Anastrozole.  She is c/o fatigue, weakness.  She is taking Methadone 10 mg BID and Gabapentin 600 mg PO q HS for pain.  Labs reviewed.  Hgb=13 on 12/2018. Last mammogram was done 10/2018 shows no malignancy.  Bone density 1/2019 shows osteopenia which is improving prior to last one. She follows up with her PCP.\par \par 12/18/19\par Patient is here today for follow up visit.  She has h/o stage IIIA breast cancer on AI since 1/2014, initially with Letrozole, then Exemestane, now Anastrozole.  She had right breast dx mammo and US in 11/2019 which did not show suspicious finding.  She has chronic arthropathy and chronic pain. She is taking Methadone 10 mg BID and Gabapentin 600 mg PO q HS for pain.  Labs reviewed. Bone density 1/2019 shows osteopenia which is improving prior to last one. \par \par 6/18/2020:\par The patient is seen via virtual telehealth. She was diagnosed with ER/[place] positive and HER2/asmita negative advanced right-sided breast cancer, status post neoadjuvant chemotherapy followed by right breast lumpectomy and right axillary lymph node dissection in 1/2014, s/p adjuvant breast radiotherapy. She has been on adjuvant endocrine therapy since 2014. She is currently taking Anastrozole. She complains fatigue and arthralgia. She has had osteoarthropathy with deformities and stiffness of her fingers for many years starting before her breast cancer diagnosis. \par Repeat bone density in 2019 showed mild osteopenia in femoral neck. Right breast dx mammo and US in 11/2019 did not show suspicious finding.

## 2020-06-20 NOTE — PHYSICAL EXAM
[Normal] : well developed, well nourished, in no acute distress [Restricted in physically strenuous activity but ambulatory and able to carry out work of a light or sedentary nature] : Status 1- Restricted in physically strenuous activity but ambulatory and able to carry out work of a light or sedentary nature, e.g., light house work, office work [de-identified] : physical exam deferred (telehealth).

## 2020-09-23 NOTE — BRIEF OPERATIVE NOTE - ANTIBIOTIC PROTOCOL
Followed protocol self fed/dependent on clinician for scooping/spoon/fed by clinician self fed/spoon/fed by clinician/dependent for scooping spoon/self fed/dependent for scooping/fed by clinician self fed/straw/cup

## 2020-10-26 ENCOUNTER — RESULT REVIEW (OUTPATIENT)
Age: 75
End: 2020-10-26

## 2020-10-26 ENCOUNTER — OUTPATIENT (OUTPATIENT)
Dept: OUTPATIENT SERVICES | Facility: HOSPITAL | Age: 75
LOS: 1 days | Discharge: HOME | End: 2020-10-26
Payer: MEDICARE

## 2020-10-26 DIAGNOSIS — Z98.890 OTHER SPECIFIED POSTPROCEDURAL STATES: Chronic | ICD-10-CM

## 2020-10-26 DIAGNOSIS — Z90.710 ACQUIRED ABSENCE OF BOTH CERVIX AND UTERUS: Chronic | ICD-10-CM

## 2020-10-26 DIAGNOSIS — Z12.31 ENCOUNTER FOR SCREENING MAMMOGRAM FOR MALIGNANT NEOPLASM OF BREAST: ICD-10-CM

## 2020-10-26 PROCEDURE — 77067 SCR MAMMO BI INCL CAD: CPT | Mod: 26

## 2020-10-26 PROCEDURE — 77063 BREAST TOMOSYNTHESIS BI: CPT | Mod: 26

## 2020-11-06 ENCOUNTER — APPOINTMENT (OUTPATIENT)
Dept: BREAST CENTER | Facility: CLINIC | Age: 75
End: 2020-11-06
Payer: MEDICARE

## 2020-11-24 ENCOUNTER — APPOINTMENT (OUTPATIENT)
Dept: BREAST CENTER | Facility: CLINIC | Age: 75
End: 2020-11-24
Payer: MEDICARE

## 2020-11-24 PROCEDURE — 99213 OFFICE O/P EST LOW 20 MIN: CPT

## 2020-11-24 NOTE — HISTORY OF PRESENT ILLNESS
[FreeTextEntry1] : Problem List:\par 1. Stage IIIA (ypT1c N2a M0) Right breast cancer, 1.1cm, ER/AL (+). HER2 (-).\par 2. s/p neoadjuvant chemotx. (Dr. Garcia) ACT\par 3. s/p Right NG Lumpectomy / AND - 01/14/14.\par 4. s/p adjuvant breast RT. (Dr. Epps).\par 5. currently on Anastrozole.\par 6. h/o osteoarthropathy in her hands and feet\par 7. s/p chemoport removal on 5/29/18\par \par RIKI GANT is a 75 year old female patient with history of right breast locally advanced cancer, who presents today for cancer follow up visit.\par \par INTERVAL HISTORY: \brionna Almazan returns for a short term follow up for locally advanced right breast cancer, treated in 2013.  \par She is continues to have increased sensitivity in her right axilla at her surgical site, but otherwise does not have any other areas of pain. She is not really able to self examinations due to severe arthritis in both hands but denies any nipple discharge or retraction.  \par \par Of note she did not have to undergo surgery for her right humerus fracture and it has healed with conservative management.  She does not have any worsening arm swelling at this time. \par \par Her most recent imaging was a b/l screening mammogram on 10/26/2020 which was unrevealing for any suspicious abnormalities, deemed BIRADS 1.

## 2020-11-24 NOTE — PHYSICAL EXAM
[Normocephalic] : normocephalic [Atraumatic] : atraumatic [EOMI] : extra ocular movement intact [No Supraclavicular Adenopathy] : no supraclavicular adenopathy [No Cervical Adenopathy] : no cervical adenopathy [Examined in the supine and seated position] : examined in the supine and seated position [No dominant masses] : no dominant masses left breast [No Nipple Retraction] : no left nipple retraction [No Nipple Discharge] : no left nipple discharge [No Axillary Lymphadenopathy] : no left axillary lymphadenopathy [Soft] : abdomen soft [Not Tender] : non-tender [No Edema] : no edema [No Swelling] : no swelling [No Rashes] : no rashes [No Ulceration] : no ulceration [de-identified] : post radiation changes, no suspicious lesions palpated in either breast, surgical incision is well healed with scar retraction and some thickened scar tissue  [de-identified] : no suspicious mass palpated

## 2020-11-24 NOTE — PAST MEDICAL HISTORY
[Surgical Menopause] : The patient is in surgical menopause [Menarche Age ____] : age at menarche was [unfilled] [Total Preg ___] : G[unfilled] [Age At Live Birth ___] : Age at live birth: [unfilled] [FreeTextEntry6] : No. [FreeTextEntry7] : No. [FreeTextEntry8] : No.

## 2020-11-24 NOTE — ASSESSMENT
[FreeTextEntry1] : RIKI is a kelin 75 year old patient who presented today in follow up for a history of Right locally advanced breast cancer, treated in 2013, [id number], ER/AL +, Her 2 neg. She is s/p Left chest chemoport removal on 5/29/18.  \par \par -s/p neoadjuvant chemotherapy with dd AC + T\par -s/p R breast WBI\par -s/p AI therapy \par \par In regards to her cancer treatment: she has been doing well with no complaints, and is currently on a Anastrazole, tolerating it well. \par \par There was no evidence of disease recurrence at this time.  \par Her most recent imaging was a b/l screening mammogram on 10/26/2020 which was unrevealing for any suspicious abnormalities, deemed BIRADS 1.   Her next screening mammogram will be due on 10/26/2021.  This was scheduled for her. I will have her follow up after for a CBE.   \par \par In regards to her undergoing right hand surgery to release some of her contracted tendons from severe arthritis, I have discussed her increased risk of healing complications related to her full axillary lymph node dissection in addition to radiation therapy on the right side.  SHe was provided with her initial pathology report to provide to her surgeon so that she can make a decision regarding this surgery. \par \par As review, RIKI was diagnosed with a locally advanced right breast carcinoma and was treated with neoadjuvant chemotherapy with ddACT followed by breast conservation surgery and axillary Lymph node dissection, [id number], ER/AL positive, Her 2 asmita negative.  She also underwent whole breast breast radiation and is currently on Anastrazole. \par \par All questions were answered and she was instructed to call with any further concerns. \par \par PLAN\par -f/up in 1 year after yearly imaging\par -tolerating AI, continue per Dr. Garcia.

## 2020-11-24 NOTE — REVIEW OF SYSTEMS
[Joint Pain] : joint pain [Joint Stiffness] : joint stiffness [As Noted in HPI] : as noted in HPI [Breast Pain] : breast pain [Negative] : Heme/Lymph [Skin Lesions] : no skin lesions [Skin Wound] : no skin wound [Breast Lump] : no breast lump

## 2020-11-24 NOTE — DATA REVIEWED
[FreeTextEntry1] : EXAM:  MG MAMMO SCREEN W KUN BI#\par \par \par PROCEDURE DATE:  10/26/2020\par \par \par \par INTERPRETATION:  HISTORY:\par Bilateral MG MAMMO SCREEN W KUN BI# was performed. Patient is 75 years old and is seen for screening. The patient has a history of Uterine/Endometrial at age 40. The patient has a history of right lumpectomy in January, 2014 - malignant and bilateral needle biopsy at age 59. The patient has no family history of breast cancer.\par \par RISK ASSESSMENT:\par Carli-Michelet Lifetime Risk: 2.3\par \par CLINICAL BREAST EXAM:\par The patient reports her last clinical breast exam was performed 10 months ago.\par \par COMPARISON STUDIES:\par The present examination has been compared to prior imaging studies performed at [facility] on 10/25/2018, 10/31/2019 and 11/05/2019.\par \par MAMMOGRAM FINDINGS:\par Mammography was performed including the following views: bilateral craniocaudal with tomosynthesis, bilateral mediolateral oblique with tomosynthesis, right craniocaudal exaggerated laterally, and right axillary tail.  The examination includes digital synthetic 2D and digital tomosynthesis 3D images. Additional imaging analysis was performed using CAD (computer-aided detection) software.\par \par There are scattered areas of fibroglandular density.\par \par No suspicious mass, grouping of calcifications, or other abnormality is identified.\par \par IMPRESSION:\par No mammographic evidence of malignancy.\par \par RECOMMENDATION:\par Unless otherwise indicated by clinical findings, annual screening mammography recommended.\par \par ASSESSMENT:\par BI-RADS Category 1:  Negative\par \par The patient will be notified of these results by telephone, and will also be mailed a written summary in layman's terms.\par \par \par \par \par \par \par \par HUGO LYNNE DO; Attending Radiologist\par This document has been electronically signed. Oct 26 2020  1:52PM

## 2021-01-29 ENCOUNTER — LABORATORY RESULT (OUTPATIENT)
Age: 76
End: 2021-01-29

## 2021-01-29 ENCOUNTER — APPOINTMENT (OUTPATIENT)
Dept: HEMATOLOGY ONCOLOGY | Facility: CLINIC | Age: 76
End: 2021-01-29
Payer: MEDICARE

## 2021-01-29 ENCOUNTER — OUTPATIENT (OUTPATIENT)
Dept: OUTPATIENT SERVICES | Facility: HOSPITAL | Age: 76
LOS: 1 days | Discharge: HOME | End: 2021-01-29

## 2021-01-29 VITALS
WEIGHT: 182 LBS | DIASTOLIC BLOOD PRESSURE: 69 MMHG | TEMPERATURE: 97.2 F | HEART RATE: 55 BPM | HEIGHT: 66 IN | SYSTOLIC BLOOD PRESSURE: 143 MMHG | BODY MASS INDEX: 29.25 KG/M2

## 2021-01-29 DIAGNOSIS — Z79.811 ENCOUNTER FOR THERAPEUTIC DRUG LVL MONITORING: ICD-10-CM

## 2021-01-29 DIAGNOSIS — Z51.81 ENCOUNTER FOR THERAPEUTIC DRUG LVL MONITORING: ICD-10-CM

## 2021-01-29 DIAGNOSIS — Z90.710 ACQUIRED ABSENCE OF BOTH CERVIX AND UTERUS: Chronic | ICD-10-CM

## 2021-01-29 DIAGNOSIS — M89.40: ICD-10-CM

## 2021-01-29 DIAGNOSIS — Z98.890 OTHER SPECIFIED POSTPROCEDURAL STATES: Chronic | ICD-10-CM

## 2021-01-29 PROCEDURE — 99214 OFFICE O/P EST MOD 30 MIN: CPT

## 2021-02-03 PROBLEM — Z51.81 ENCOUNTER FOR MONITORING AROMATASE INHIBITOR THERAPY: Status: ACTIVE | Noted: 2019-06-26

## 2021-02-03 NOTE — HISTORY OF PRESENT ILLNESS
[de-identified] : 73-year-old female is here today for a followup visit.  She has a history of locally advanced right-sided breast cancer and initially presented with multiple right axillary lymphadenopathy and right supraclavicular lymphadenopathy at the time of diagnosis in 2013.  The biopsy of the right breast mass revealed infiltrating poorly differentiated ductal carcinoma, ER/OR positive and HER2/asmita negative.  Her initial PET/CT showed FDGavid right axillary and right supraclavicular lymphadenopathy as well as FDGavid breast mass.  There was no evidence of distant metastasis.  She received neoadjuvant chemotherapy with dose-dense Adriamycin and Cytoxan for four cycles followed by paclitaxel every other week for four cycles.  She finished her chemotherapy in 10/2013.  She had a good partial response.  In 01/2014, she had a right breast lumpectomy and right axillary lymph node dissection.  The pathology revealed residual invasive poorly differentiated ductal carcinoma measuring 1.1cm.  There was no lymphovascular or perineural invasion.  A 4/23 lymph nodes was positive for metastatic carcinoma with focal extranodal extension.  The AJCC Seventh Edition Pathologic Stage after neoadjuvant chemotherapy was IIIA (ypT1c N2a M0).   After surgery, she received adjuvant breast radiotherapy.  Subsequently, she started on adjuvant endocrine therapy initially with letrozole, followed by exemestane.  She had a lot of body aching and fatigue from the medication.  Eventually, she switched to anastrozole, and has been tolerating it better. The patient also has a history of osteoarthropathy in her hands and feet.  She has stiffness in her finger joints and cannot extend her fingers.  She saw several rheumatologists; however, did not have a clear diagnosis.  The patient has been seeing Dr. Greenfield for pain management.  In 10/23/17 she had a bilateral diagnostic mammogram and breast ultrasound which did not reveal suspicious finding. In September 2016 she had a bone density which showed osteopenia in the hip. Her bone density in the spine and the femoral neck was normal. She has been taking calcium and vitamin D supplements. \par \par \par  [de-identified] : In 10/2017, b/l dx mammo did not reveal suspicious finding.\par Interim. she saw Dr. Raman and had her port removed.\par She has arthritis and chronic joint pain and stiffness. She is taking methadone for pain. \par \par 12/20/18:\par The patient is here for followup visit. She has been taking Anastrozole daily. She had b/l screening mammo in 10/2018. There was no suspicious finding. Her last bone density was in 9/2016. She still has pain and stiffness in her hands.\par \par 6/26/19\par Patient is here today for follow up visit.  She has h/o stage IIIA breast cancer on AI since 1/2014, initially with Letrozole, then Exemestane, now Anastrozole.  She is c/o fatigue, weakness.  She is taking Methadone 10 mg BID and Gabapentin 600 mg PO q HS for pain.  Labs reviewed.  Hgb=13 on 12/2018. Last mammogram was done 10/2018 shows no malignancy.  Bone density 1/2019 shows osteopenia which is improving prior to last one. She follows up with her PCP.\par \par 12/18/19\par Patient is here today for follow up visit.  She has h/o stage IIIA breast cancer on AI since 1/2014, initially with Letrozole, then Exemestane, now Anastrozole.  She had right breast dx mammo and US in 11/2019 which did not show suspicious finding.  She has chronic arthropathy and chronic pain. She is taking Methadone 10 mg BID and Gabapentin 600 mg PO q HS for pain.  Labs reviewed. Bone density 1/2019 shows osteopenia which is improving prior to last one. \par \par 6/18/2020:\par The patient is seen via virtual telehealth. She was diagnosed with ER/ND positive and HER2/asmita negative advanced right-sided breast cancer, status post neoadjuvant chemotherapy followed by right breast lumpectomy and right axillary lymph node dissection in 1/2014, s/p adjuvant breast radiotherapy. She has been on adjuvant endocrine therapy since 2014. She is currently taking Anastrozole. She complains fatigue and arthralgia. She has had osteoarthropathy with deformities and stiffness of her fingers for many years starting before her breast cancer diagnosis. \par Repeat bone density in 2019 showed mild osteopenia in femoral neck. Right breast dx mammo and US in 11/2019 did not show suspicious finding.\par \par 1/29/2021:\brionna Almazan is here for followup visit. She was diagnosed with ER/ND positive and HER2/asmita negative advanced right-sided breast cancer, status post neoadjuvant chemotherapy followed by right breast lumpectomy and right axillary lymph node dissection in 1/2014, s/p adjuvant breast radiotherapy. She has been on adjuvant endocrine therapy since 2014. She is currently taking Anastrozole. She complains fatigue and arthralgia. She has had osteoarthropathy with deformities and stiffness of her fingers for many years started before her breast cancer diagnosis. She reports increased pain in her hands. \par Repeat bone density in 2019 showed mild osteopenia in femoral neck. Right breast dx mammo and US in 11/2019 did not show suspicious finding. [Home] : at home, [unfilled] , at the time of the visit. [Medical Office: ([facility])___] : at the medical office located in  [Verbal consent obtained from patient] : the patient, [unfilled]

## 2021-02-03 NOTE — ASSESSMENT
[FreeTextEntry1] : ER/MN positive and HER2/asmita negative advanced right-sided breast cancer, status post neoadjuvant chemotherapy followed by right breast lumpectomy and right axillary lymph node dissection, adjuvant breast radiotherapy; currently on adjuvant endocrine therapy.  There is no evidence of recurrent disease.\par \par RECOMMENDATION: \par -- Continue Anastrozole, calcium and vitamin D supplement. She will benefit with extended endocrine therapy due to h/o locally advanced breast cancer.\par -- Bilateral screening mammo in 10/2020 reviewed and did not show suspicious finding. Breast exam today is unrevealing.  Continue annual screening mammogram. A referral is given to her. \par -- Bone density study in 1/2019 showed osteopenia in hip T score -2.3 and in femoral neck T score -1.2. Will refer her for repeat bone density. \par -- Followup with rheumatologist for osteoarthropathy. \par -- Follow up with her PCP for her other health care issues.\par -- RTO for followup in 6 months.\par

## 2021-02-03 NOTE — PHYSICAL EXAM
[Restricted in physically strenuous activity but ambulatory and able to carry out work of a light or sedentary nature] : Status 1- Restricted in physically strenuous activity but ambulatory and able to carry out work of a light or sedentary nature, e.g., light house work, office work [Normal] : well developed, well nourished, in no acute distress [de-identified] : physical exam deferred (telehealth).

## 2021-02-09 DIAGNOSIS — Z51.81 ENCOUNTER FOR THERAPEUTIC DRUG LEVEL MONITORING: ICD-10-CM

## 2021-02-09 DIAGNOSIS — C50.911 MALIGNANT NEOPLASM OF UNSPECIFIED SITE OF RIGHT FEMALE BREAST: ICD-10-CM

## 2021-02-09 DIAGNOSIS — M89.40 OTHER HYPERTROPHIC OSTEOARTHROPATHY, UNSPECIFIED SITE: ICD-10-CM

## 2021-02-09 DIAGNOSIS — M85.80 OTHER SPECIFIED DISORDERS OF BONE DENSITY AND STRUCTURE, UNSPECIFIED SITE: ICD-10-CM

## 2021-07-27 ENCOUNTER — FORM ENCOUNTER (OUTPATIENT)
Age: 76
End: 2021-07-27

## 2021-07-28 ENCOUNTER — FORM ENCOUNTER (OUTPATIENT)
Age: 76
End: 2021-07-28

## 2021-07-28 ENCOUNTER — TRANSCRIPTION ENCOUNTER (OUTPATIENT)
Age: 76
End: 2021-07-28

## 2021-07-30 ENCOUNTER — EMERGENCY (EMERGENCY)
Facility: HOSPITAL | Age: 76
LOS: 0 days | Discharge: HOME | End: 2021-07-30
Attending: EMERGENCY MEDICINE | Admitting: EMERGENCY MEDICINE
Payer: MEDICARE

## 2021-07-30 ENCOUNTER — APPOINTMENT (OUTPATIENT)
Dept: DISASTER EMERGENCY | Facility: HOSPITAL | Age: 76
End: 2021-07-30

## 2021-07-30 VITALS
HEART RATE: 68 BPM | TEMPERATURE: 98 F | OXYGEN SATURATION: 95 % | RESPIRATION RATE: 20 BRPM | SYSTOLIC BLOOD PRESSURE: 155 MMHG | DIASTOLIC BLOOD PRESSURE: 68 MMHG

## 2021-07-30 VITALS
SYSTOLIC BLOOD PRESSURE: 146 MMHG | WEIGHT: 169.98 LBS | OXYGEN SATURATION: 96 % | HEIGHT: 68 IN | HEART RATE: 82 BPM | RESPIRATION RATE: 20 BRPM | DIASTOLIC BLOOD PRESSURE: 63 MMHG | TEMPERATURE: 96 F

## 2021-07-30 DIAGNOSIS — Z79.4 LONG TERM (CURRENT) USE OF INSULIN: ICD-10-CM

## 2021-07-30 DIAGNOSIS — E66.9 OBESITY, UNSPECIFIED: ICD-10-CM

## 2021-07-30 DIAGNOSIS — Z98.890 OTHER SPECIFIED POSTPROCEDURAL STATES: Chronic | ICD-10-CM

## 2021-07-30 DIAGNOSIS — Z90.710 ACQUIRED ABSENCE OF BOTH CERVIX AND UTERUS: Chronic | ICD-10-CM

## 2021-07-30 DIAGNOSIS — Z88.1 ALLERGY STATUS TO OTHER ANTIBIOTIC AGENTS STATUS: ICD-10-CM

## 2021-07-30 DIAGNOSIS — Z79.899 OTHER LONG TERM (CURRENT) DRUG THERAPY: ICD-10-CM

## 2021-07-30 DIAGNOSIS — U07.1 COVID-19: ICD-10-CM

## 2021-07-30 DIAGNOSIS — E11.9 TYPE 2 DIABETES MELLITUS WITHOUT COMPLICATIONS: ICD-10-CM

## 2021-07-30 DIAGNOSIS — Z88.0 ALLERGY STATUS TO PENICILLIN: ICD-10-CM

## 2021-07-30 PROCEDURE — L9998: CPT

## 2021-07-30 RX ORDER — SODIUM CHLORIDE 9 MG/ML
250 INJECTION INTRAMUSCULAR; INTRAVENOUS; SUBCUTANEOUS
Refills: 0 | Status: COMPLETED | OUTPATIENT
Start: 2021-07-30 | End: 2021-07-30

## 2021-07-30 RX ADMIN — SODIUM CHLORIDE 25 MILLILITER(S): 9 INJECTION INTRAMUSCULAR; INTRAVENOUS; SUBCUTANEOUS at 11:27

## 2021-07-30 NOTE — ED PROVIDER NOTE - PHYSICAL EXAMINATION
Gen: Alert, NAD, well appearing  Head: NC, AT, PERRL, EOMI, normal lids/conjunctiva  ENT: normal hearing  Neck: +supple, no tenderness/meningismus,  Pulm: Bilateral BS, normal resp effort, +crackles at bases  CV: RRR  Abd: soft, NT/ND  Mskel: no edema/erythema/cyanosis  Skin: no rash, warm/dry  Neuro: AAOx3, no sensory/motor deficits

## 2021-07-30 NOTE — ED ADULT NURSE NOTE - NSIMPLEMENTINTERV_GEN_ALL_ED
Implemented All Fall Risk Interventions:  [place] to call system. Call bell, personal items and telephone within reach. Instruct patient to call for assistance. Room bathroom lighting operational. Non-slip footwear when patient is off stretcher. Physically safe environment: no spills, clutter or unnecessary equipment. Stretcher in lowest position, wheels locked, appropriate side rails in place. Provide visual cue, wrist band, yellow gown, etc. Monitor gait and stability. Monitor for mental status changes and reorient to person, place, and time. Review medications for side effects contributing to fall risk. Reinforce activity limits and safety measures with patient and family.

## 2021-07-30 NOTE — ED PROVIDER NOTE - NSFOLLOWUPINSTRUCTIONS_ED_ALL_ED_FT
Novel Coronavirus (COVID-19)  The Facts  What is a coronavirus?  Coronaviruses are a large family of viruses that cause illnesses ranging from the common cold  to more severe diseases such as Middle East Respiratory Syndrome (MERS) and Severe Acute  Respiratory Syndrome (SARS).  What is Novel Coronavirus (COVID-19)?  COVID-19 is a new strain of Coronavirus that has not been previously identified in humans. COVID-19  was identified in [place], [place], [place] in December 2019 (COVID-19). COVID-19 has  since been identified outside of [place], in a growing number of countries internationally, including  the [place].  Where can I find the most recent information about COVID-19?  The Centers for Disease Control and Prevention (CDC) is closely monitoring the outbreak caused by the  COVID-19. For the latest information about COVID- 19, visit the CDC website at  https://www.cdc.gov/coronavirus/index.html  How are coronaviruses spread?  Coronaviruses can be transmitted from person-to- person, usually after close contact with an infected person,  for example, in a household, workplace, or healthcare setting via droplets that become airborne after a cough  or sneeze by an affected person. These droplets can then infect a nearby person. It is likely transmission also  occurs by touching recently contaminated surfaces.  What are the symptoms of coronavirus infection?  It depends on the virus, but common signs include fever and/or respiratory symptoms such as  cough and shortness of breath. In more severe cases, infection can cause pneumonia, severe acute  respiratory syndrome, kidney failure and even death. Fortunately, most cases of COVID-19 have an  illness no different than the influenza “flu”. With a majority of these patients having mild symptoms  and overall mortality which appears to be not much different than the flu.  Is there a treatment for a COVID-19?  There is no specific treatment for disease caused by COVID-19. However, many of the symptoms can  be treated based on the patient’s clinical condition. Supportive care for infected persons can be highly  effective.  What can I do to protect myself?  Washing your hands, covering your cough, and disinfecting surfaces are the best precautionary  measures. It is also advisable to avoid close contact with anyone showing symptoms of respiratory  illness such as coughing and sneezing. Those with symptoms should wear a surgical mask when  around others.  What can I do to protect those around me?  If you have been identified as someone who may be infected with COVID-19, we recommend you  follow the self-isolation procedures outlined below to protect those around you and limit the spread  of this virus.   March 3, 2020  Recommendations for Patients Advised to Self-Isolate  for Possible COVID-19 Exposure  We recommend the below precautionary steps from now until 14 days from when you  returned from your travel or date of your last known possible contact:  - Do not go to work, school, or public areas. Avoid using public transportation, ride-sharing, or  taxis.  - As much as possible, separate yourself from other people in your home. If you can, you should  stay in a room and away from other people in your home. Also, you should use a separate  bathroom, if available.  - Wear the supplied mask whenever you are around other people.  - If you have a non-urgent medical appointment, please reschedule for a later date. If the  appointment is urgent, please call the healthcare provider and tell them that you are on selfisolation for possible COVID-19. This will help the healthcare provider’s office take steps to keep  other people from getting infected or exposed. If you can reschedule routine appointments, do  so.  - Wash your hands often with soap and water for at least 15 to 20 seconds or clean your hands  with an alcohol-based hand  that contains 60 to 95% alcohol, covering all surfaces of  your hands and rubbing them together until they feel dry. Soap and water should be used  preferentially if hands are visibly dirty.  - Cover your mouth and nose with a tissue when you cough or sneeze. Throw used tissues in a  lined trash can; immediately wash your hands.  - Avoid touching your eyes, nose, and mouth with your hands.  - Avoid sharing personal household items. You should not share dishes, drinking glasses, cups,  eating utensils, towels, or bedding with other people or pets in your home. After using these  items, they should be washed thoroughly with soap and water.  - Clean and disinfect all “high-touch” surfaces every day. High touch surfaces include counters,  tabletops, doorknobs, light switches, remote controls, bathroom fixtures, toilets, phones,  keyboards, tablets, and bedside tables. Also, clean any surfaces that may have blood, stool, or  body fluids on them.       If you develop worsening symptoms:  During your time on self-isolation do the following:  - Work from home if you are able to so.  - Limit social isolation by talking with friends and family on the phone or with face-time  - Talk with friends and relatives who don’t live with you about supporting each other if one  household has to be quarantined. For example, agree to drop groceries or other supplies at the  front door.  - Exercise and spend time outdoors away from others if able to do so.      You should return to the Emergency Department if you develop worse symptoms, trouble  breathing, chest pain, and/or a fever that doesn’t improve with over the counter  acetaminophen or ibuprofen.

## 2021-07-30 NOTE — ED PROVIDER NOTE - CLINICAL SUMMARY MEDICAL DECISION MAKING FREE TEXT BOX
Pt tolerated infusion without incident and was observed. No reaction noted. Pt d/c to f/u PMD. Return precautions discussed.

## 2021-07-30 NOTE — ED PROVIDER NOTE - ATTENDING CONTRIBUTION TO CARE
75yo woman was sent in for MAB infusion s/p COVID-19 sx x 8 days with positive test 2 days ago. VS, exam as noted, pt with cough but no SOB. Poor appetite but no nausea/vomiting/diarrhea. VS, exam as noted. Will infuse, observe, d/c to f/u PMD.

## 2021-07-30 NOTE — ED PROVIDER NOTE - PATIENT PORTAL LINK FT
You can access the FollowMyHealth Patient Portal offered by [facility] by registering at the following website: [email or web address]. By joining [facility]’s FollowMyHealth portal, you will also be able to view your health information using other applications (apps) compatible with our system.

## 2021-07-30 NOTE — ED PROVIDER NOTE - PROGRESS NOTE DETAILS
Patient received monoclonal antibody infusion with no adverse reaction. Patient planned for discharge home and follow up with outpatient [facility] program.

## 2021-07-30 NOTE — ED PROVIDER NOTE - OBJECTIVE STATEMENT
scheduled MAB infusion, requesting no professional billing    Patient has history of COVID-19 symptoms for [ 8 ] days  COVID + 2 days ago    Patient has high risk factors that include:  [  ] Chronic Kidney Disease [ X ] Diabetes Mellitus [  ] Immune Suppressive [  ] Receiving Immunosuppressive Treatment [ X ] Overweight/Obesity BMI greater than 25 [ X ] Age greater than or equal 65 years [  ] Cardiovascular Disease [  ] HTN [  ] COPD/Other Chronic Respiratory Disease [  ] Sickle Cell Disease [  ] Congenital/Acquired Heart Disease [  ] Neurodevelopmental Disorder [  ] Medical related technology dependence [  ] Asthma/Chronic Respiratory Disease [  ] Immune Suppressive Disease [  ] Receiving Immune Suppressive Therapy [  ] Other:     Patient received prior COVID treatment that included [  ]  Patient provided explanation of monoclonal antibody infusion and is in agreement with treatment plan. See consent form in medical record.

## 2021-07-30 NOTE — ED PROVIDER NOTE - NS ED ROS FT
Review of Systems    Constitutional: (-) fever, (-) chills, (+) weakness  Eyes/ENT: (-) blurry vision, (-) epistaxis, (-) sore throat  Cardiovascular: (-) chest pain, (-) syncope  Respiratory: (+) cough, (-) shortness of breath  Gastrointestinal: (+) loss of appetite, (-) pain, (-) nausea, (-) vomiting, (-) diarrhea  Musculoskeletal: (-) neck pain, (-) back pain, (-) body aches  Integumentary: (-) rash, (-) edema  Neurological: (-) headache, (-) altered mental status

## 2021-08-03 ENCOUNTER — TRANSCRIPTION ENCOUNTER (OUTPATIENT)
Age: 76
End: 2021-08-03

## 2021-08-04 ENCOUNTER — TRANSCRIPTION ENCOUNTER (OUTPATIENT)
Age: 76
End: 2021-08-04

## 2021-08-06 ENCOUNTER — APPOINTMENT (OUTPATIENT)
Dept: HEMATOLOGY ONCOLOGY | Facility: CLINIC | Age: 76
End: 2021-08-06

## 2021-09-27 ENCOUNTER — LABORATORY RESULT (OUTPATIENT)
Age: 76
End: 2021-09-27

## 2021-09-27 ENCOUNTER — OUTPATIENT (OUTPATIENT)
Dept: OUTPATIENT SERVICES | Facility: HOSPITAL | Age: 76
LOS: 1 days | Discharge: HOME | End: 2021-09-27

## 2021-09-27 ENCOUNTER — APPOINTMENT (OUTPATIENT)
Dept: HEMATOLOGY ONCOLOGY | Facility: CLINIC | Age: 76
End: 2021-09-27
Payer: MEDICARE

## 2021-09-27 VITALS
TEMPERATURE: 97.4 F | HEIGHT: 66 IN | BODY MASS INDEX: 27.97 KG/M2 | SYSTOLIC BLOOD PRESSURE: 151 MMHG | HEART RATE: 61 BPM | DIASTOLIC BLOOD PRESSURE: 78 MMHG | WEIGHT: 174 LBS

## 2021-09-27 DIAGNOSIS — Z90.710 ACQUIRED ABSENCE OF BOTH CERVIX AND UTERUS: Chronic | ICD-10-CM

## 2021-09-27 DIAGNOSIS — Z98.890 OTHER SPECIFIED POSTPROCEDURAL STATES: Chronic | ICD-10-CM

## 2021-09-27 DIAGNOSIS — M85.80 OTHER SPECIFIED DISORDERS OF BONE DENSITY AND STRUCTURE, UNSPECIFIED SITE: ICD-10-CM

## 2021-09-27 LAB
ALBUMIN SERPL ELPH-MCNC: 4 G/DL
ALP BLD-CCNC: 106 U/L
ALT SERPL-CCNC: 13 U/L
ANION GAP SERPL CALC-SCNC: 8 MMOL/L
AST SERPL-CCNC: 15 U/L
BILIRUB DIRECT SERPL-MCNC: <0.2 MG/DL
BILIRUB INDIRECT SERPL-MCNC: >0.1 MG/DL
BILIRUB SERPL-MCNC: 0.3 MG/DL
BUN SERPL-MCNC: 17 MG/DL
CALCIUM SERPL-MCNC: 9.5 MG/DL
CANCER AG15-3 SERPL-ACNC: 13.4 U/ML
CEA SERPL-MCNC: 3.4 NG/ML
CHLORIDE SERPL-SCNC: 101 MMOL/L
CO2 SERPL-SCNC: 28 MMOL/L
CREAT SERPL-MCNC: 0.5 MG/DL
GLUCOSE SERPL-MCNC: 216 MG/DL
HCT VFR BLD CALC: 39.9 %
HGB BLD-MCNC: 13.2 G/DL
MCHC RBC-ENTMCNC: 29.7 PG
MCHC RBC-ENTMCNC: 33.1 G/DL
MCV RBC AUTO: 89.9 FL
PLATELET # BLD AUTO: 206 K/UL
PMV BLD: 9.4 FL
POTASSIUM SERPL-SCNC: 4.8 MMOL/L
PROT SERPL-MCNC: 6.8 G/DL
RBC # BLD: 4.44 M/UL
RBC # FLD: 12.7 %
SODIUM SERPL-SCNC: 137 MMOL/L
WBC # FLD AUTO: 4 K/UL

## 2021-09-27 PROCEDURE — 99214 OFFICE O/P EST MOD 30 MIN: CPT

## 2021-09-28 PROBLEM — M85.80 OSTEOPENIA: Status: ACTIVE | Noted: 2019-06-26

## 2021-09-28 NOTE — ASSESSMENT
[FreeTextEntry1] : ER/RI positive and HER2/asmita negative advanced right-sided breast cancer, status post neoadjuvant chemotherapy followed by right breast lumpectomy and right axillary lymph node dissection, adjuvant breast radiotherapy; currently on adjuvant endocrine therapy.  There is no evidence of recurrent disease.\par \par RECOMMENDATION: \par -- Continue Anastrozole, calcium and vitamin D supplement. She will benefit with extended endocrine therapy due to h/o locally advanced breast cancer.\par -- Check CBC, BMP, LFT, CEA, Ca15.3\par -- CEA Is elevated to 4.4 ng/mL. Will monitor.\par -- Bilateral screening mammo in 10/2020 reviewed and did not show suspicious finding. Breast exam today is unrevealing. Continue annual screening mammogram and right breast US. A referral is given to her. \par -- Bone density study in 1/2019 showed osteopenia in hip T score -2.3 and in femoral neck T score -1.2. Ordered for repeat DEXA. \par -- Followup with rheumatologist for osteoarthropathy. \par -- Follow up with her PCP for her other health care issues.\par -- RTO for followup in 6 months.\par

## 2021-09-28 NOTE — HISTORY OF PRESENT ILLNESS
[de-identified] : 73-year-old female is here today for a followup visit.  She has a history of locally advanced right-sided breast cancer and initially presented with multiple right axillary lymphadenopathy and right supraclavicular lymphadenopathy at the time of diagnosis in 2013.  The biopsy of the right breast mass revealed infiltrating poorly differentiated ductal carcinoma, ER/SC positive and HER2/asmita negative.  Her initial PET/CT showed FDGavid right axillary and right supraclavicular lymphadenopathy as well as FDGavid breast mass.  There was no evidence of distant metastasis.  She received neoadjuvant chemotherapy with dose-dense Adriamycin and Cytoxan for four cycles followed by paclitaxel every other week for four cycles.  She finished her chemotherapy in 10/2013.  She had a good partial response.  In 01/2014, she had a right breast lumpectomy and right axillary lymph node dissection.  The pathology revealed residual invasive poorly differentiated ductal carcinoma measuring 1.1cm.  There was no lymphovascular or perineural invasion.  A 4/23 lymph nodes was positive for metastatic carcinoma with focal extranodal extension.  The AJCC Seventh Edition Pathologic Stage after neoadjuvant chemotherapy was IIIA (ypT1c N2a M0).   After surgery, she received adjuvant breast radiotherapy.  Subsequently, she started on adjuvant endocrine therapy initially with letrozole, followed by exemestane.  She had a lot of body aching and fatigue from the medication.  Eventually, she switched to anastrozole, and has been tolerating it better. The patient also has a history of osteoarthropathy in her hands and feet.  She has stiffness in her finger joints and cannot extend her fingers.  She saw several rheumatologists; however, did not have a clear diagnosis.  The patient has been seeing Dr. Greenfield for pain management.  In 10/23/17 she had a bilateral diagnostic mammogram and breast ultrasound which did not reveal suspicious finding. In September 2016 she had a bone density which showed osteopenia in the hip. Her bone density in the spine and the femoral neck was normal. She has been taking calcium and vitamin D supplements. \par \par \par  [de-identified] : In 10/2017, b/l dx mammo did not reveal suspicious finding.\par Interim. she saw Dr. Raman and had her port removed.\par She has arthritis and chronic joint pain and stiffness. She is taking methadone for pain. \par \par 12/20/18:\par The patient is here for followup visit. She has been taking Anastrozole daily. She had b/l screening mammo in 10/2018. There was no suspicious finding. Her last bone density was in 9/2016. She still has pain and stiffness in her hands.\par \par 6/26/19\par Patient is here today for follow up visit.  She has h/o stage IIIA breast cancer on AI since 1/2014, initially with Letrozole, then Exemestane, now Anastrozole.  She is c/o fatigue, weakness.  She is taking Methadone 10 mg BID and Gabapentin 600 mg PO q HS for pain.  Labs reviewed.  Hgb=13 on 12/2018. Last mammogram was done 10/2018 shows no malignancy.  Bone density 1/2019 shows osteopenia which is improving prior to last one. She follows up with her PCP.\par \par 12/18/19\par Patient is here today for follow up visit.  She has h/o stage IIIA breast cancer on AI since 1/2014, initially with Letrozole, then Exemestane, now Anastrozole.  She had right breast dx mammo and US in 11/2019 which did not show suspicious finding.  She has chronic arthropathy and chronic pain. She is taking Methadone 10 mg BID and Gabapentin 600 mg PO q HS for pain.  Labs reviewed. Bone density 1/2019 shows osteopenia which is improving prior to last one. \par \par 6/18/2020:\par The patient is seen via virtual telehealth. She was diagnosed with ER/IL positive and HER2/asmita negative advanced right-sided breast cancer, status post neoadjuvant chemotherapy followed by right breast lumpectomy and right axillary lymph node dissection in 1/2014, s/p adjuvant breast radiotherapy. She has been on adjuvant endocrine therapy since 2014. She is currently taking Anastrozole. She complains fatigue and arthralgia. She has had osteoarthropathy with deformities and stiffness of her fingers for many years starting before her breast cancer diagnosis. \par Repeat bone density in 2019 showed mild osteopenia in femoral neck. Right breast dx mammo and US in 11/2019 did not show suspicious finding.\par \par 1/29/2021:\brionna Almazan is here for followup visit. She was diagnosed with ER/IL positive and HER2/asmita negative advanced right-sided breast cancer, status post neoadjuvant chemotherapy followed by right breast lumpectomy and right axillary lymph node dissection in 1/2014, s/p adjuvant breast radiotherapy. She has been on adjuvant endocrine therapy since 2014. She is currently taking Anastrozole. She complains fatigue and arthralgia. She has had osteoarthropathy with deformities and stiffness of her fingers for many years started before her breast cancer diagnosis. She reports increased pain in her hands. \par Repeat bone density in 2019 showed mild osteopenia in femoral neck. Right breast dx mammo and US in 11/2019 did not show suspicious finding.\par \par 9/27/2021:\brionna Almazan is here for followup visit. She was diagnosed with ER/IL positive and HER2/asmita negative advanced right-sided breast cancer, status post neoadjuvant chemotherapy followed by right breast lumpectomy and right axillary lymph node dissection in 1/2014, s/p adjuvant breast radiotherapy. She has been on adjuvant endocrine therapy since 2014. She is currently taking Anastrozole. She has had osteoarthropathy with deformities and stiffness of her fingers for many years started before her breast cancer diagnosis. Today she is mostly stable. She has the chronic pain in her hands. She had COVID in July but has now mostly recovered; her  had severe COVID and was hospitalized as well. She is complaining of fatigue. She is past due for a DEXA and is due for a mammogram in October 2021. Her CEA is mildly elevated. \par

## 2021-09-28 NOTE — PHYSICAL EXAM
[Ambulatory and capable of all self care but unable to carry out any work activities] : Status 2- Ambulatory and capable of all self care but unable to carry out any work activities. Up and about more than 50% of waking hours [Normal] : normal appearance, no rash, nodules, vesicles, ulcers, erythema [de-identified] : Hands appear contracted/severe arthritis

## 2021-09-28 NOTE — REVIEW OF SYSTEMS
[Joint Pain] : joint pain [Joint Stiffness] : joint stiffness [Negative] : Endocrine [Fatigue] : fatigue

## 2021-10-04 ENCOUNTER — TRANSCRIPTION ENCOUNTER (OUTPATIENT)
Age: 76
End: 2021-10-04

## 2021-10-29 ENCOUNTER — RESULT REVIEW (OUTPATIENT)
Age: 76
End: 2021-10-29

## 2021-10-29 ENCOUNTER — OUTPATIENT (OUTPATIENT)
Dept: OUTPATIENT SERVICES | Facility: HOSPITAL | Age: 76
LOS: 1 days | Discharge: HOME | End: 2021-10-29
Payer: MEDICARE

## 2021-10-29 DIAGNOSIS — Z90.710 ACQUIRED ABSENCE OF BOTH CERVIX AND UTERUS: Chronic | ICD-10-CM

## 2021-10-29 DIAGNOSIS — Z12.31 ENCOUNTER FOR SCREENING MAMMOGRAM FOR MALIGNANT NEOPLASM OF BREAST: ICD-10-CM

## 2021-10-29 DIAGNOSIS — Z98.890 OTHER SPECIFIED POSTPROCEDURAL STATES: Chronic | ICD-10-CM

## 2021-10-29 PROCEDURE — 77063 BREAST TOMOSYNTHESIS BI: CPT | Mod: 26

## 2021-10-29 PROCEDURE — 77067 SCR MAMMO BI INCL CAD: CPT | Mod: 26

## 2021-11-09 ENCOUNTER — OUTPATIENT (OUTPATIENT)
Dept: OUTPATIENT SERVICES | Facility: HOSPITAL | Age: 76
LOS: 1 days | Discharge: HOME | End: 2021-11-09

## 2021-11-09 DIAGNOSIS — Z98.890 OTHER SPECIFIED POSTPROCEDURAL STATES: Chronic | ICD-10-CM

## 2021-11-09 DIAGNOSIS — Z90.710 ACQUIRED ABSENCE OF BOTH CERVIX AND UTERUS: Chronic | ICD-10-CM

## 2021-11-13 DIAGNOSIS — Z13.820 ENCOUNTER FOR SCREENING FOR OSTEOPOROSIS: ICD-10-CM

## 2021-11-13 DIAGNOSIS — Z78.0 ASYMPTOMATIC MENOPAUSAL STATE: ICD-10-CM

## 2021-11-13 DIAGNOSIS — M81.0 AGE-RELATED OSTEOPOROSIS WITHOUT CURRENT PATHOLOGICAL FRACTURE: ICD-10-CM

## 2022-03-28 ENCOUNTER — APPOINTMENT (OUTPATIENT)
Dept: HEMATOLOGY ONCOLOGY | Facility: CLINIC | Age: 77
End: 2022-03-28

## 2022-07-17 ENCOUNTER — NON-APPOINTMENT (OUTPATIENT)
Age: 77
End: 2022-07-17

## 2022-09-15 ENCOUNTER — APPOINTMENT (OUTPATIENT)
Dept: HEMATOLOGY ONCOLOGY | Facility: CLINIC | Age: 77
End: 2022-09-15

## 2022-09-15 ENCOUNTER — OUTPATIENT (OUTPATIENT)
Dept: OUTPATIENT SERVICES | Facility: HOSPITAL | Age: 77
LOS: 1 days | Discharge: HOME | End: 2022-09-15

## 2022-09-15 VITALS
DIASTOLIC BLOOD PRESSURE: 89 MMHG | RESPIRATION RATE: 14 BRPM | WEIGHT: 186 LBS | TEMPERATURE: 97.2 F | SYSTOLIC BLOOD PRESSURE: 152 MMHG | HEIGHT: 66 IN | BODY MASS INDEX: 29.89 KG/M2 | HEART RATE: 60 BPM

## 2022-09-15 DIAGNOSIS — Z79.811 LONG TERM (CURRENT) USE OF AROMATASE INHIBITORS: ICD-10-CM

## 2022-09-15 DIAGNOSIS — Z90.710 ACQUIRED ABSENCE OF BOTH CERVIX AND UTERUS: Chronic | ICD-10-CM

## 2022-09-15 DIAGNOSIS — C50.911 MALIGNANT NEOPLASM OF UNSPECIFIED SITE OF RIGHT FEMALE BREAST: ICD-10-CM

## 2022-09-15 DIAGNOSIS — M81.0 AGE-RELATED OSTEOPOROSIS W/OUT CURRENT PATHOLOGICAL FRACTURE: ICD-10-CM

## 2022-09-15 DIAGNOSIS — Z98.890 OTHER SPECIFIED POSTPROCEDURAL STATES: Chronic | ICD-10-CM

## 2022-09-15 LAB
ALBUMIN SERPL ELPH-MCNC: 4 G/DL
ALBUMIN SERPL ELPH-MCNC: 4.1 G/DL
ALP BLD-CCNC: 127 U/L
ALP BLD-CCNC: 85 U/L
ALT SERPL-CCNC: 11 U/L
ALT SERPL-CCNC: 15 U/L
ANION GAP SERPL CALC-SCNC: 12 MMOL/L
ANION GAP SERPL CALC-SCNC: 16 MMOL/L
AST SERPL-CCNC: 17 U/L
AST SERPL-CCNC: 18 U/L
BASOPHILS # BLD AUTO: 0.03 K/UL
BASOPHILS NFR BLD AUTO: 0.5 %
BILIRUB DIRECT SERPL-MCNC: <0.2 MG/DL
BILIRUB DIRECT SERPL-MCNC: <0.2 MG/DL
BILIRUB INDIRECT SERPL-MCNC: >0.1 MG/DL
BILIRUB INDIRECT SERPL-MCNC: >0.1 MG/DL
BILIRUB SERPL-MCNC: 0.3 MG/DL
BILIRUB SERPL-MCNC: 0.3 MG/DL
BUN SERPL-MCNC: 14 MG/DL
BUN SERPL-MCNC: 15 MG/DL
CALCIUM SERPL-MCNC: 9.7 MG/DL
CALCIUM SERPL-MCNC: 9.7 MG/DL
CANCER AG15-3 SERPL-ACNC: 19.7 U/ML
CEA SERPL-MCNC: 4.4 NG/ML
CHLORIDE SERPL-SCNC: 101 MMOL/L
CHLORIDE SERPL-SCNC: 103 MMOL/L
CO2 SERPL-SCNC: 21 MMOL/L
CO2 SERPL-SCNC: 25 MMOL/L
CREAT SERPL-MCNC: 0.5 MG/DL
CREAT SERPL-MCNC: 0.5 MG/DL
EGFR: 97 ML/MIN/1.73M2
EOSINOPHIL # BLD AUTO: 0.22 K/UL
EOSINOPHIL NFR BLD AUTO: 3.5 %
GLUCOSE SERPL-MCNC: 135 MG/DL
GLUCOSE SERPL-MCNC: 182 MG/DL
HCT VFR BLD CALC: 38.3 %
HCT VFR BLD CALC: 40.7 %
HGB BLD-MCNC: 12.8 G/DL
HGB BLD-MCNC: 13.7 G/DL
IMM GRANULOCYTES NFR BLD AUTO: 0.5 %
LYMPHOCYTES # BLD AUTO: 1.22 K/UL
LYMPHOCYTES NFR BLD AUTO: 19.6 %
MAN DIFF?: NORMAL
MCHC RBC-ENTMCNC: 29.7 PG
MCHC RBC-ENTMCNC: 29.8 PG
MCHC RBC-ENTMCNC: 33.4 G/DL
MCHC RBC-ENTMCNC: 33.7 G/DL
MCV RBC AUTO: 88.1 FL
MCV RBC AUTO: 89.3 FL
MONOCYTES # BLD AUTO: 0.58 K/UL
MONOCYTES NFR BLD AUTO: 9.3 %
NEUTROPHILS # BLD AUTO: 4.14 K/UL
NEUTROPHILS NFR BLD AUTO: 66.6 %
PLATELET # BLD AUTO: 191 K/UL
PLATELET # BLD AUTO: 200 K/UL
PMV BLD: 9.4 FL
POTASSIUM SERPL-SCNC: 4.3 MMOL/L
POTASSIUM SERPL-SCNC: 4.4 MMOL/L
PROT SERPL-MCNC: 6.6 G/DL
PROT SERPL-MCNC: 6.9 G/DL
RBC # BLD: 4.29 M/UL
RBC # BLD: 4.62 M/UL
RBC # FLD: 12.5 %
RBC # FLD: 13.6 %
SODIUM SERPL-SCNC: 138 MMOL/L
SODIUM SERPL-SCNC: 140 MMOL/L
WBC # FLD AUTO: 4.97 K/UL
WBC # FLD AUTO: 6.22 K/UL

## 2022-09-15 PROCEDURE — 99214 OFFICE O/P EST MOD 30 MIN: CPT

## 2022-09-15 RX ORDER — ANASTROZOLE TABLETS 1 MG/1
1 TABLET ORAL
Qty: 90 | Refills: 2 | Status: ACTIVE | COMMUNITY
Start: 2020-06-18 | End: 1900-01-01

## 2022-09-15 RX ORDER — ANASTROZOLE TABLETS 1 MG/1
1 TABLET ORAL DAILY
Qty: 30 | Refills: 0 | Status: ACTIVE | COMMUNITY
Start: 2017-01-03

## 2022-09-15 NOTE — REVIEW OF SYSTEMS
[Fatigue] : fatigue [Joint Pain] : joint pain [Joint Stiffness] : joint stiffness [Negative] : Endocrine

## 2022-09-15 NOTE — ASSESSMENT
[FreeTextEntry1] : ER/TX positive and HER2/asmita negative advanced right-sided breast cancer, status post neoadjuvant chemotherapy followed by right breast lumpectomy and right axillary lymph node dissection, adjuvant breast radiotherapy; currently on adjuvant endocrine therapy.  There is no evidence of recurrent disease.\par \par RECOMMENDATION: \par -- Continue Anastrozole, calcium and vitamin D supplement. She is on extended endocrine therapy due to h/o locally advanced breast cancer.\par -- Check CBC, BMP, LFT, CEA, Ca15.3\par -- CEA Is elevated to 4.4 ng/mL. Will monitor.\par -- Bilateral screening mammo in 10/2021 reviewed and did not show suspicious finding. Breast exam today is unrevealing. Continue annual screening mammogram and right breast US. A referral is given to her. \par -- Bone density study in 11/2021 showed osteoporosis and osteopenia in hip T score -3.0. Discussed bone health management. She is advised to take bisphosphonate or Prolia injection. Benefit and side effects reveiwed. She wants to think about. The copies of the report were given to her. \par -- Followup with rheumatologist for osteoarthropathy. \par -- Follow up with her PCP for her other health care issues.\par -- RTO for followup in 6 months.\par \par

## 2022-09-15 NOTE — HISTORY OF PRESENT ILLNESS
[de-identified] : 73-year-old female is here today for a followup visit.  She has a history of locally advanced right-sided breast cancer and initially presented with multiple right axillary lymphadenopathy and right supraclavicular lymphadenopathy at the time of diagnosis in 2013.  The biopsy of the right breast mass revealed infiltrating poorly differentiated ductal carcinoma, ER/CO positive and HER2/asmita negative.  Her initial PET/CT showed FDGavid right axillary and right supraclavicular lymphadenopathy as well as FDGavid breast mass.  There was no evidence of distant metastasis.  She received neoadjuvant chemotherapy with dose-dense Adriamycin and Cytoxan for four cycles followed by paclitaxel every other week for four cycles.  She finished her chemotherapy in 10/2013.  She had a good partial response.  In 01/2014, she had a right breast lumpectomy and right axillary lymph node dissection.  The pathology revealed residual invasive poorly differentiated ductal carcinoma measuring 1.1cm.  There was no lymphovascular or perineural invasion.  A 4/23 lymph nodes was positive for metastatic carcinoma with focal extranodal extension.  The AJCC Seventh Edition Pathologic Stage after neoadjuvant chemotherapy was IIIA (ypT1c N2a M0).   After surgery, she received adjuvant breast radiotherapy.  Subsequently, she started on adjuvant endocrine therapy initially with letrozole, followed by exemestane.  She had a lot of body aching and fatigue from the medication.  Eventually, she switched to anastrozole, and has been tolerating it better. The patient also has a history of osteoarthropathy in her hands and feet.  She has stiffness in her finger joints and cannot extend her fingers.  She saw several rheumatologists; however, did not have a clear diagnosis.  The patient has been seeing Dr. Greenfield for pain management.  In 10/23/17 she had a bilateral diagnostic mammogram and breast ultrasound which did not reveal suspicious finding. In September 2016 she had a bone density which showed osteopenia in the hip. Her bone density in the spine and the femoral neck was normal. She has been taking calcium and vitamin D supplements. \par \par \par  [de-identified] : In 10/2017, b/l dx mammo did not reveal suspicious finding.\par Interim. she saw Dr. Raman and had her port removed.\par She has arthritis and chronic joint pain and stiffness. She is taking methadone for pain. \par \par 12/20/18:\par The patient is here for followup visit. She has been taking Anastrozole daily. She had b/l screening mammo in 10/2018. There was no suspicious finding. Her last bone density was in 9/2016. She still has pain and stiffness in her hands.\par \par 6/26/19\par Patient is here today for follow up visit.  She has h/o stage IIIA breast cancer on AI since 1/2014, initially with Letrozole, then Exemestane, now Anastrozole.  She is c/o fatigue, weakness.  She is taking Methadone 10 mg BID and Gabapentin 600 mg PO q HS for pain.  Labs reviewed.  Hgb=13 on 12/2018. Last mammogram was done 10/2018 shows no malignancy.  Bone density 1/2019 shows osteopenia which is improving prior to last one. She follows up with her PCP.\par \par 12/18/19\par Patient is here today for follow up visit.  She has h/o stage IIIA breast cancer on AI since 1/2014, initially with Letrozole, then Exemestane, now Anastrozole.  She had right breast dx mammo and US in 11/2019 which did not show suspicious finding.  She has chronic arthropathy and chronic pain. She is taking Methadone 10 mg BID and Gabapentin 600 mg PO q HS for pain.  Labs reviewed. Bone density 1/2019 shows osteopenia which is improving prior to last one. \par \par 6/18/2020:\par The patient is seen via virtual telehealth. She was diagnosed with ER/[place] positive and HER2/asmita negative advanced right-sided breast cancer, status post neoadjuvant chemotherapy followed by right breast lumpectomy and right axillary lymph node dissection in 1/2014, s/p adjuvant breast radiotherapy. She has been on adjuvant endocrine therapy since 2014. She is currently taking Anastrozole. She complains fatigue and arthralgia. She has had osteoarthropathy with deformities and stiffness of her fingers for many years starting before her breast cancer diagnosis. \par Repeat bone density in 2019 showed mild osteopenia in femoral neck. Right breast dx mammo and US in 11/2019 did not show suspicious finding.\par \par 1/29/2021:\brionna Almazan is here for followup visit. She was diagnosed with ER/NY positive and HER2/asmita negative advanced right-sided breast cancer, status post neoadjuvant chemotherapy followed by right breast lumpectomy and right axillary lymph node dissection in 1/2014, s/p adjuvant breast radiotherapy. She has been on adjuvant endocrine therapy since 2014. She is currently taking Anastrozole. She complains fatigue and arthralgia. She has had osteoarthropathy with deformities and stiffness of her fingers for many years started before her breast cancer diagnosis. She reports increased pain in her hands. \par Repeat bone density in 2019 showed mild osteopenia in femoral neck. Right breast dx mammo and US in 11/2019 did not show suspicious finding.\par \par 9/27/2021:\brionna Almazan is here for followup visit. She was diagnosed with ER/NY positive and HER2/asmita negative advanced right-sided breast cancer, status post neoadjuvant chemotherapy followed by right breast lumpectomy and right axillary lymph node dissection in 1/2014, s/p adjuvant breast radiotherapy. She has been on adjuvant endocrine therapy since 2014. She is currently taking Anastrozole. She has had osteoarthropathy with deformities and stiffness of her fingers for many years started before her breast cancer diagnosis. Today she is mostly stable. She has the chronic pain in her hands. She had COVID in July but has now mostly recovered; her  had severe COVID and was hospitalized as well. She is complaining of fatigue. She is past due for a DEXA and is due for a mammogram in October 2021. Her CEA is mildly elevated. \par \par 09/15/22\brionna Almazan is here for followup visit. She was diagnosed with ER/NY positive and HER2/asmita negative advanced right-sided breast cancer, status post neoadjuvant chemotherapy followed by right breast lumpectomy and right axillary lymph node dissection in 1/2014, s/p adjuvant breast radiotherapy. She has been on adjuvant endocrine therapy since 2014. She is currently taking Anastrozole. She has had osteoarthropathy with deformities and stiffness of her fingers for many years started before her breast cancer diagnosis. She is due for her annual screening mammogram in October. Reviewed DEXA from 11/2021, osteoporosis T score -3.0. She is past due for a DEXA and is due for a mammogram in October 2021. Her CEA is mildly elevated. \par \par

## 2022-09-15 NOTE — PHYSICAL EXAM
[Ambulatory and capable of all self care but unable to carry out any work activities] : Status 2- Ambulatory and capable of all self care but unable to carry out any work activities. Up and about more than 50% of waking hours [Normal] : normal appearance, no rash, nodules, vesicles, ulcers, erythema [de-identified] : Hands appear contracted/severe arthritis

## 2022-09-16 DIAGNOSIS — M81.0 AGE-RELATED OSTEOPOROSIS WITHOUT CURRENT PATHOLOGICAL FRACTURE: ICD-10-CM

## 2022-09-16 DIAGNOSIS — C50.911 MALIGNANT NEOPLASM OF UNSPECIFIED SITE OF RIGHT FEMALE BREAST: ICD-10-CM

## 2022-09-16 DIAGNOSIS — Z79.811 LONG TERM (CURRENT) USE OF AROMATASE INHIBITORS: ICD-10-CM

## 2022-09-16 LAB
CANCER AG15-3 SERPL-ACNC: 15.7 U/ML
CEA SERPL-MCNC: 3.9 NG/ML

## 2023-03-16 ENCOUNTER — APPOINTMENT (OUTPATIENT)
Dept: HEMATOLOGY ONCOLOGY | Facility: CLINIC | Age: 78
End: 2023-03-16